# Patient Record
Sex: MALE | Race: WHITE | NOT HISPANIC OR LATINO
[De-identification: names, ages, dates, MRNs, and addresses within clinical notes are randomized per-mention and may not be internally consistent; named-entity substitution may affect disease eponyms.]

---

## 2018-01-23 ENCOUNTER — TRANSCRIPTION ENCOUNTER (OUTPATIENT)
Age: 63
End: 2018-01-23

## 2018-03-16 ENCOUNTER — APPOINTMENT (OUTPATIENT)
Dept: ORTHOPEDIC SURGERY | Facility: CLINIC | Age: 63
End: 2018-03-16
Payer: COMMERCIAL

## 2018-03-16 VITALS — WEIGHT: 225 LBS | BODY MASS INDEX: 28.88 KG/M2 | HEIGHT: 74 IN

## 2018-03-16 DIAGNOSIS — Z87.39 PERSONAL HISTORY OF OTHER DISEASES OF THE MUSCULOSKELETAL SYSTEM AND CONNECTIVE TISSUE: ICD-10-CM

## 2018-03-16 DIAGNOSIS — Z86.39 PERSONAL HISTORY OF OTHER ENDOCRINE, NUTRITIONAL AND METABOLIC DISEASE: ICD-10-CM

## 2018-03-16 DIAGNOSIS — Z78.9 OTHER SPECIFIED HEALTH STATUS: ICD-10-CM

## 2018-03-16 DIAGNOSIS — M54.5 LOW BACK PAIN: ICD-10-CM

## 2018-03-16 PROCEDURE — 73562 X-RAY EXAM OF KNEE 3: CPT | Mod: 50

## 2018-03-16 PROCEDURE — 99204 OFFICE O/P NEW MOD 45 MIN: CPT

## 2018-03-16 RX ORDER — ATENOLOL 25 MG/1
25 TABLET ORAL
Qty: 90 | Refills: 0 | Status: ACTIVE | COMMUNITY
Start: 2017-10-05

## 2018-03-16 RX ORDER — ALLOPURINOL 300 MG/1
300 TABLET ORAL
Qty: 90 | Refills: 0 | Status: ACTIVE | COMMUNITY
Start: 2017-10-18

## 2018-03-26 ENCOUNTER — APPOINTMENT (OUTPATIENT)
Dept: ORTHOPEDIC SURGERY | Facility: CLINIC | Age: 63
End: 2018-03-26
Payer: COMMERCIAL

## 2018-03-26 DIAGNOSIS — M47.817 SPONDYLOSIS W/OUT MYELOPATHY OR RADICULOPATHY, LUMBOSACRAL REGION: ICD-10-CM

## 2018-03-26 PROCEDURE — 72100 X-RAY EXAM L-S SPINE 2/3 VWS: CPT

## 2018-03-26 PROCEDURE — 99215 OFFICE O/P EST HI 40 MIN: CPT

## 2018-05-21 ENCOUNTER — NON-APPOINTMENT (OUTPATIENT)
Age: 63
End: 2018-05-21

## 2018-05-21 ENCOUNTER — APPOINTMENT (OUTPATIENT)
Dept: CARDIOLOGY | Facility: CLINIC | Age: 63
End: 2018-05-21
Payer: COMMERCIAL

## 2018-05-21 VITALS
HEIGHT: 74 IN | BODY MASS INDEX: 29.9 KG/M2 | SYSTOLIC BLOOD PRESSURE: 157 MMHG | WEIGHT: 233 LBS | DIASTOLIC BLOOD PRESSURE: 80 MMHG | OXYGEN SATURATION: 94 % | HEART RATE: 85 BPM

## 2018-05-21 DIAGNOSIS — Z82.49 FAMILY HISTORY OF ISCHEMIC HEART DISEASE AND OTHER DISEASES OF THE CIRCULATORY SYSTEM: ICD-10-CM

## 2018-05-21 DIAGNOSIS — R73.03 PREDIABETES.: ICD-10-CM

## 2018-05-21 DIAGNOSIS — R09.89 OTHER SPECIFIED SYMPTOMS AND SIGNS INVOLVING THE CIRCULATORY AND RESPIRATORY SYSTEMS: ICD-10-CM

## 2018-05-21 DIAGNOSIS — R07.2 PRECORDIAL PAIN: ICD-10-CM

## 2018-05-21 PROCEDURE — 93000 ELECTROCARDIOGRAM COMPLETE: CPT

## 2018-05-21 PROCEDURE — 99205 OFFICE O/P NEW HI 60 MIN: CPT

## 2018-05-23 ENCOUNTER — APPOINTMENT (OUTPATIENT)
Dept: CARDIOLOGY | Facility: CLINIC | Age: 63
End: 2018-05-23
Payer: COMMERCIAL

## 2018-05-23 PROCEDURE — 93015 CV STRESS TEST SUPVJ I&R: CPT

## 2018-06-02 ENCOUNTER — APPOINTMENT (OUTPATIENT)
Dept: CARDIOLOGY | Facility: CLINIC | Age: 63
End: 2018-06-02
Payer: COMMERCIAL

## 2018-06-02 PROCEDURE — 93880 EXTRACRANIAL BILAT STUDY: CPT

## 2018-06-20 ENCOUNTER — APPOINTMENT (OUTPATIENT)
Dept: ORTHOPEDIC SURGERY | Facility: CLINIC | Age: 63
End: 2018-06-20
Payer: COMMERCIAL

## 2018-06-20 PROCEDURE — 73562 X-RAY EXAM OF KNEE 3: CPT | Mod: 50

## 2018-06-20 PROCEDURE — 99214 OFFICE O/P EST MOD 30 MIN: CPT

## 2018-06-20 RX ORDER — LANCETS 30 GAUGE
EACH MISCELLANEOUS
Qty: 300 | Refills: 0 | Status: ACTIVE | COMMUNITY
Start: 2018-04-10

## 2018-06-21 ENCOUNTER — LABORATORY RESULT (OUTPATIENT)
Age: 63
End: 2018-06-21

## 2018-07-09 ENCOUNTER — APPOINTMENT (OUTPATIENT)
Dept: RADIOLOGY | Facility: HOSPITAL | Age: 63
End: 2018-07-09
Payer: COMMERCIAL

## 2018-07-09 ENCOUNTER — OUTPATIENT (OUTPATIENT)
Dept: OUTPATIENT SERVICES | Facility: HOSPITAL | Age: 63
LOS: 1 days | End: 2018-07-09

## 2018-07-09 DIAGNOSIS — Z98.89 OTHER SPECIFIED POSTPROCEDURAL STATES: Chronic | ICD-10-CM

## 2018-07-09 DIAGNOSIS — R11.2 NAUSEA WITH VOMITING, UNSPECIFIED: ICD-10-CM

## 2018-07-09 PROCEDURE — 74220 X-RAY XM ESOPHAGUS 1CNTRST: CPT | Mod: 26

## 2018-07-11 ENCOUNTER — APPOINTMENT (OUTPATIENT)
Dept: ORTHOPEDIC SURGERY | Facility: CLINIC | Age: 63
End: 2018-07-11
Payer: COMMERCIAL

## 2018-07-11 PROCEDURE — 99214 OFFICE O/P EST MOD 30 MIN: CPT

## 2018-07-26 PROBLEM — M47.817 LUMBOSACRAL SPONDYLOSIS: Status: ACTIVE | Noted: 2018-03-26

## 2018-10-12 ENCOUNTER — APPOINTMENT (OUTPATIENT)
Dept: ORTHOPEDIC SURGERY | Facility: CLINIC | Age: 63
End: 2018-10-12
Payer: COMMERCIAL

## 2018-10-12 DIAGNOSIS — M25.561 PAIN IN RIGHT KNEE: ICD-10-CM

## 2018-10-12 DIAGNOSIS — M25.562 PAIN IN RIGHT KNEE: ICD-10-CM

## 2018-10-12 DIAGNOSIS — T84.82XA FIBROSIS DUE TO INTERNAL ORTHOPEDIC PROSTHETIC DEVICES, IMPLANTS AND GRAFTS, INITIAL ENCOUNTER: ICD-10-CM

## 2018-10-12 PROCEDURE — 99214 OFFICE O/P EST MOD 30 MIN: CPT

## 2018-10-12 PROCEDURE — 73562 X-RAY EXAM OF KNEE 3: CPT | Mod: 50

## 2018-12-26 ENCOUNTER — CLINICAL ADVICE (OUTPATIENT)
Age: 63
End: 2018-12-26

## 2019-01-04 ENCOUNTER — OUTPATIENT (OUTPATIENT)
Dept: OUTPATIENT SERVICES | Facility: HOSPITAL | Age: 64
LOS: 1 days | Discharge: HOME | End: 2019-01-04
Payer: MEDICARE

## 2019-01-04 VITALS
OXYGEN SATURATION: 96 % | SYSTOLIC BLOOD PRESSURE: 121 MMHG | HEIGHT: 74 IN | TEMPERATURE: 98 F | HEART RATE: 73 BPM | WEIGHT: 234.35 LBS | DIASTOLIC BLOOD PRESSURE: 74 MMHG | RESPIRATION RATE: 18 BRPM

## 2019-01-04 VITALS
HEART RATE: 73 BPM | OXYGEN SATURATION: 96 % | TEMPERATURE: 98 F | HEIGHT: 74 IN | WEIGHT: 234.35 LBS | RESPIRATION RATE: 18 BRPM | DIASTOLIC BLOOD PRESSURE: 74 MMHG | SYSTOLIC BLOOD PRESSURE: 121 MMHG

## 2019-01-04 DIAGNOSIS — M25.662 STIFFNESS OF LEFT KNEE, NOT ELSEWHERE CLASSIFIED: ICD-10-CM

## 2019-01-04 DIAGNOSIS — N40.0 BENIGN PROSTATIC HYPERPLASIA WITHOUT LOWER URINARY TRACT SYMPTOMS: ICD-10-CM

## 2019-01-04 DIAGNOSIS — Z98.89 OTHER SPECIFIED POSTPROCEDURAL STATES: Chronic | ICD-10-CM

## 2019-01-04 DIAGNOSIS — I10 ESSENTIAL (PRIMARY) HYPERTENSION: ICD-10-CM

## 2019-01-04 DIAGNOSIS — E11.9 TYPE 2 DIABETES MELLITUS WITHOUT COMPLICATIONS: ICD-10-CM

## 2019-01-04 DIAGNOSIS — Z96.653 PRESENCE OF ARTIFICIAL KNEE JOINT, BILATERAL: Chronic | ICD-10-CM

## 2019-01-04 DIAGNOSIS — Z01.818 ENCOUNTER FOR OTHER PREPROCEDURAL EXAMINATION: ICD-10-CM

## 2019-01-04 LAB
APPEARANCE UR: CLEAR — SIGNIFICANT CHANGE UP
APTT BLD: 34.9 SEC — SIGNIFICANT CHANGE UP (ref 28.5–37)
BACTERIA # UR AUTO: ABNORMAL
BILIRUB UR-MCNC: NEGATIVE — SIGNIFICANT CHANGE UP
BLD GP AB SCN SERPL QL: SIGNIFICANT CHANGE UP
COLOR SPEC: YELLOW — SIGNIFICANT CHANGE UP
DIFF PNL FLD: ABNORMAL
EPI CELLS # UR: SIGNIFICANT CHANGE UP
GLUCOSE UR QL: NEGATIVE MG/DL — SIGNIFICANT CHANGE UP
INR BLD: 1.13 RATIO — SIGNIFICANT CHANGE UP (ref 0.88–1.16)
KETONES UR-MCNC: NEGATIVE — SIGNIFICANT CHANGE UP
LEUKOCYTE ESTERASE UR-ACNC: ABNORMAL
MRSA PCR RESULT.: SIGNIFICANT CHANGE UP
NITRITE UR-MCNC: NEGATIVE — SIGNIFICANT CHANGE UP
PH UR: 5 — SIGNIFICANT CHANGE UP (ref 5–8)
PROT UR-MCNC: NEGATIVE MG/DL — SIGNIFICANT CHANGE UP
PROTHROM AB SERPL-ACNC: 12.7 SEC — SIGNIFICANT CHANGE UP (ref 10–12.9)
RBC CASTS # UR COMP ASSIST: SIGNIFICANT CHANGE UP /HPF (ref 0–4)
S AUREUS DNA NOSE QL NAA+PROBE: SIGNIFICANT CHANGE UP
SP GR SPEC: 1.02 — SIGNIFICANT CHANGE UP (ref 1.01–1.02)
UROBILINOGEN FLD QL: NEGATIVE MG/DL — SIGNIFICANT CHANGE UP
WBC UR QL: >50

## 2019-01-04 PROCEDURE — 93010 ELECTROCARDIOGRAM REPORT: CPT | Mod: NC

## 2019-01-04 NOTE — H&P PST ADULT - HISTORY OF PRESENT ILLNESS
64yo male with medical h/o BPH, T2DM, HTN, OA, with PSHx of bilateral knee replacement in 2015. Pt reports pain to left knee s/p replacement. Pt presents today for presurgical testing for Revision of Left Total Knee Replacement scheduled for 1/15/2019

## 2019-01-04 NOTE — OCCUPATIONAL THERAPY INITIAL EVALUATION ADULT - PERTINENT HX OF CURRENT PROBLEM, REHAB EVAL
Pt is slated for elective surgery for revision of left TKR, at a later date with MD due to OA, pain and DJD. Pt has a history of multiple comorbidities.

## 2019-01-04 NOTE — OCCUPATIONAL THERAPY INITIAL EVALUATION ADULT - COORDINATION ASSESSED, REHAB EVAL
heel to shin/intact in BUE/RLE, diminished in left LE,/finger to nose intact in BUE/RLE, diminished in left LE,/finger to nose/heel to shin

## 2019-01-04 NOTE — OCCUPATIONAL THERAPY INITIAL EVALUATION ADULT - RANGE OF MOTION EXAMINATION, LOWER EXTREMITY
Pt has limitation in flexion and extension in left knee/Left LE Active ROM was WFL (within functional limits)/Left LE Passive ROM was WFL (w/i functional limits)

## 2019-01-04 NOTE — OCCUPATIONAL THERAPY INITIAL EVALUATION ADULT - ANTICIPATED DISCHARGE DISPOSITION, OT EVAL
Recommend home with raised toilet seat with hand bars , rolling walker and OT referral to enable patient to safely perform ADL management and functional mobility.

## 2019-01-04 NOTE — H&P PST ADULT - PMH
BPH (benign prostatic hypertrophy)    Diabetes    Essential hypertension    Hepatitis A    Osteoarthritis of both knees, unspecified osteoarthritis type    Tendonitis  posterior left ankle - s/p MRI - PT currently  Ureteral calculus  2013 - passed on own - takes allopurinol

## 2019-01-04 NOTE — PHYSICAL THERAPY INITIAL EVALUATION ADULT - PERTINENT HX OF CURRENT PROBLEM, REHAB EVAL
Patient attends Pre-Op Testing today following consult with Dr. Daniels due to chronic pain to revision TKR. Significant surgical/medical histories of. Elective LTKR Revision is now scheduled in this facility for 01/15/19.

## 2019-01-04 NOTE — H&P PST ADULT - NEGATIVE GENERAL GENITOURINARY SYMPTOMS
no dysuria/no urinary hesitancy/normal urinary frequency/no bladder infections/no hematuria/no flank pain L/no renal colic/no flank pain R/no urine discoloration/no incontinence/no nocturia

## 2019-01-04 NOTE — PHYSICAL THERAPY INITIAL EVALUATION ADULT - ADDITIONAL COMMENTS
Typical pain range from rest to activity is 7-10/10. Per patient, pain is worse at the end of the day. Pain is relieved by rest.  Reports lives with in private house with a threshold to enter. Bedroom and bathroom at same level. Bathroom is a step-in shower. Endorses will be supported by wife post-op. Patient is independent with mobility. Reports no longer has mobility/adaptive devices from previous surgery. 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 Patient denies a fall in past 6 months.

## 2019-01-04 NOTE — OCCUPATIONAL THERAPY INITIAL EVALUATION ADULT - SOCIAL CONCERNS
Complex psychosocial needs/coping issues/Pt has his spouse to assist him after discharge home post-operatively.

## 2019-01-04 NOTE — H&P PST ADULT - NSANTHOSAYNRD_GEN_A_CORE
No. BORIS screening performed.  STOP BANG Legend: 0-2 = LOW Risk; 3-4 = INTERMEDIATE Risk; 5-8 = HIGH Risk

## 2019-01-04 NOTE — OCCUPATIONAL THERAPY INITIAL EVALUATION ADULT - LIVES WITH, PROFILE
in a private house with a small step to enter. All living amenities are located on one level./spouse spouse/in a private house with a small step to enter. All living amenities are located on one level. The bathroom has a walk shower and comfort height  toilet.

## 2019-01-04 NOTE — H&P PST ADULT - PROBLEM SELECTOR PLAN 1
Revision of Left Total Knee Replacement scheduled for 1/15/2019  Pre-op instructions given. Pt verbalized understanding  Chlorhexidine wash instructions given  Pending: Medical clearance

## 2019-01-04 NOTE — OCCUPATIONAL THERAPY INITIAL EVALUATION ADULT - ADDITIONAL COMMENTS
Pt is functioning in his roles, self sufficient, driving & ambulating independently in the community without any assistive devices. Pt owns no DME . Pt is right hand dominant and wears glasses for reading. Pt c/o daily  pain with intensity 7/10 in left knee  by the end of each day. this impact ambulation . sleeping an prolonged standing ;pt takes no medication  PRN for pain relief. Pt scores 100% of patient specific scale . Pt exercises 3-4 times at the gym weekly and was receiving out patient PT intervention up until a month ago. Pt is functioning in his roles, self sufficient, driving & ambulating independently in the community without any assistive devices. Pt owns no DME . Pt is right hand dominant and wears glasses for reading. Pt c/o daily pain with intensity 7/10 in his left knee, by the end of each day. This impact ambulation , sleeping an prolonged standing ; pt takes no medication  PRN for pain relief. Pt scores 100% of patient specific scale . Pt exercises 3-4 times at the gym weekly and was receiving out patient PT intervention up until a month ago.

## 2019-01-04 NOTE — PHYSICAL THERAPY INITIAL EVALUATION ADULT - GENERAL OBSERVATIONS, REHAB EVAL
Patient encountered sitting in [facility] waiting area, agreeable to PT pre-op evaluation. Patient is for elective revision of left total knee arthroplasty at a later date from now.

## 2019-01-04 NOTE — PHYSICAL THERAPY INITIAL EVALUATION ADULT - CRITERIA FOR SKILLED THERAPEUTIC INTERVENTIONS
anticipated discharge recommendation/functional limitations in following categories/:CH,:CH,:CH/impairments found/risk reduction/prevention

## 2019-01-04 NOTE — H&P PST ADULT - NEGATIVE MUSCULOSKELETAL SYMPTOMS
no muscle weakness/no leg pain R/no arthralgia/no joint swelling/no myalgia/no stiffness/no arm pain L/no arm pain R/no neck pain/no muscle cramps/no back pain

## 2019-01-04 NOTE — PHYSICAL THERAPY INITIAL EVALUATION ADULT - MODIFIED CLINICAL TEST OF SENSORY INTEGRATION IN BALANCE TEST
5x Sit to Stand Test = unable due to limitation in motion and weakness, indicating significant impairment c functional mobility & strength  ; 2 Minute Walk Test = 422 feet without devices or rest stops, Pain rating 5/10, measured for baseline recording

## 2019-01-14 RX ORDER — ACETAMINOPHEN 500 MG
975 TABLET ORAL EVERY 8 HOURS
Qty: 0 | Refills: 0 | Status: DISCONTINUED | OUTPATIENT
Start: 2019-01-15 | End: 2019-01-17

## 2019-01-14 RX ORDER — POLYETHYLENE GLYCOL 3350 17 G/17G
17 POWDER, FOR SOLUTION ORAL DAILY
Qty: 0 | Refills: 0 | Status: DISCONTINUED | OUTPATIENT
Start: 2019-01-15 | End: 2019-01-17

## 2019-01-14 RX ORDER — SENNA PLUS 8.6 MG/1
2 TABLET ORAL AT BEDTIME
Qty: 0 | Refills: 0 | Status: DISCONTINUED | OUTPATIENT
Start: 2019-01-15 | End: 2019-01-17

## 2019-01-14 RX ORDER — OXYCODONE HYDROCHLORIDE 5 MG/1
5 TABLET ORAL EVERY 4 HOURS
Qty: 0 | Refills: 0 | Status: DISCONTINUED | OUTPATIENT
Start: 2019-01-15 | End: 2019-01-17

## 2019-01-14 RX ORDER — ONDANSETRON 8 MG/1
4 TABLET, FILM COATED ORAL EVERY 6 HOURS
Qty: 0 | Refills: 0 | Status: DISCONTINUED | OUTPATIENT
Start: 2019-01-15 | End: 2019-01-17

## 2019-01-14 RX ORDER — SODIUM CHLORIDE 9 MG/ML
1000 INJECTION, SOLUTION INTRAVENOUS
Qty: 0 | Refills: 0 | Status: DISCONTINUED | OUTPATIENT
Start: 2019-01-15 | End: 2019-01-17

## 2019-01-14 RX ORDER — DOCUSATE SODIUM 100 MG
100 CAPSULE ORAL THREE TIMES A DAY
Qty: 0 | Refills: 0 | Status: DISCONTINUED | OUTPATIENT
Start: 2019-01-15 | End: 2019-01-17

## 2019-01-14 RX ORDER — ATENOLOL 25 MG/1
25 TABLET ORAL DAILY
Qty: 0 | Refills: 0 | Status: DISCONTINUED | OUTPATIENT
Start: 2019-01-15 | End: 2019-01-17

## 2019-01-14 RX ORDER — TAMSULOSIN HYDROCHLORIDE 0.4 MG/1
0.4 CAPSULE ORAL DAILY
Qty: 0 | Refills: 0 | Status: DISCONTINUED | OUTPATIENT
Start: 2019-01-15 | End: 2019-01-17

## 2019-01-14 RX ORDER — ALLOPURINOL 300 MG
300 TABLET ORAL DAILY
Qty: 0 | Refills: 0 | Status: DISCONTINUED | OUTPATIENT
Start: 2019-01-15 | End: 2019-01-17

## 2019-01-14 RX ORDER — OXYCODONE HYDROCHLORIDE 5 MG/1
10 TABLET ORAL EVERY 4 HOURS
Qty: 0 | Refills: 0 | Status: DISCONTINUED | OUTPATIENT
Start: 2019-01-15 | End: 2019-01-17

## 2019-01-14 RX ORDER — INSULIN LISPRO 100/ML
VIAL (ML) SUBCUTANEOUS
Qty: 0 | Refills: 0 | Status: DISCONTINUED | OUTPATIENT
Start: 2019-01-15 | End: 2019-01-17

## 2019-01-14 RX ORDER — ENOXAPARIN SODIUM 100 MG/ML
40 INJECTION SUBCUTANEOUS EVERY 24 HOURS
Qty: 0 | Refills: 0 | Status: DISCONTINUED | OUTPATIENT
Start: 2019-01-16 | End: 2019-01-17

## 2019-01-14 RX ORDER — BENZOCAINE AND MENTHOL 5; 1 G/100ML; G/100ML
1 LIQUID ORAL EVERY 6 HOURS
Qty: 0 | Refills: 0 | Status: DISCONTINUED | OUTPATIENT
Start: 2019-01-15 | End: 2019-01-17

## 2019-01-14 RX ORDER — HYDROMORPHONE HYDROCHLORIDE 2 MG/ML
0.5 INJECTION INTRAMUSCULAR; INTRAVENOUS; SUBCUTANEOUS EVERY 4 HOURS
Qty: 0 | Refills: 0 | Status: DISCONTINUED | OUTPATIENT
Start: 2019-01-15 | End: 2019-01-17

## 2019-01-14 RX ORDER — MAGNESIUM HYDROXIDE 400 MG/1
30 TABLET, CHEWABLE ORAL DAILY
Qty: 0 | Refills: 0 | Status: DISCONTINUED | OUTPATIENT
Start: 2019-01-15 | End: 2019-01-17

## 2019-01-14 RX ORDER — ACETAMINOPHEN 500 MG
650 TABLET ORAL EVERY 6 HOURS
Qty: 0 | Refills: 0 | Status: DISCONTINUED | OUTPATIENT
Start: 2019-01-15 | End: 2019-01-17

## 2019-01-14 RX ORDER — METOCLOPRAMIDE HCL 10 MG
10 TABLET ORAL EVERY 6 HOURS
Qty: 0 | Refills: 0 | Status: DISCONTINUED | OUTPATIENT
Start: 2019-01-15 | End: 2019-01-17

## 2019-01-15 ENCOUNTER — INPATIENT (INPATIENT)
Facility: HOSPITAL | Age: 64
LOS: 1 days | Discharge: HOME HEALTH | End: 2019-01-17
Attending: ORTHOPAEDIC SURGERY | Admitting: ORTHOPAEDIC SURGERY
Payer: MEDICARE

## 2019-01-15 ENCOUNTER — TRANSCRIPTION ENCOUNTER (OUTPATIENT)
Age: 64
End: 2019-01-15

## 2019-01-15 ENCOUNTER — RESULT REVIEW (OUTPATIENT)
Age: 64
End: 2019-01-15

## 2019-01-15 ENCOUNTER — APPOINTMENT (OUTPATIENT)
Dept: ORTHOPEDIC SURGERY | Facility: HOSPITAL | Age: 64
End: 2019-01-15

## 2019-01-15 VITALS
RESPIRATION RATE: 15 BRPM | WEIGHT: 229.94 LBS | TEMPERATURE: 97 F | HEIGHT: 74 IN | DIASTOLIC BLOOD PRESSURE: 75 MMHG | OXYGEN SATURATION: 97 % | SYSTOLIC BLOOD PRESSURE: 139 MMHG | HEART RATE: 71 BPM

## 2019-01-15 DIAGNOSIS — Z96.653 PRESENCE OF ARTIFICIAL KNEE JOINT, BILATERAL: Chronic | ICD-10-CM

## 2019-01-15 DIAGNOSIS — Z98.89 OTHER SPECIFIED POSTPROCEDURAL STATES: Chronic | ICD-10-CM

## 2019-01-15 LAB
ANION GAP SERPL CALC-SCNC: 7 MMOL/L — SIGNIFICANT CHANGE UP (ref 5–17)
BUN SERPL-MCNC: 14 MG/DL — SIGNIFICANT CHANGE UP (ref 7–23)
CALCIUM SERPL-MCNC: 8.8 MG/DL — SIGNIFICANT CHANGE UP (ref 8.5–10.1)
CHLORIDE SERPL-SCNC: 107 MMOL/L — SIGNIFICANT CHANGE UP (ref 96–108)
CO2 SERPL-SCNC: 26 MMOL/L — SIGNIFICANT CHANGE UP (ref 22–31)
CREAT SERPL-MCNC: 0.84 MG/DL — SIGNIFICANT CHANGE UP (ref 0.5–1.3)
GLUCOSE BLDC GLUCOMTR-MCNC: 113 MG/DL — HIGH (ref 70–99)
GLUCOSE BLDC GLUCOMTR-MCNC: 155 MG/DL — HIGH (ref 70–99)
GLUCOSE BLDC GLUCOMTR-MCNC: 196 MG/DL — HIGH (ref 70–99)
GLUCOSE SERPL-MCNC: 162 MG/DL — HIGH (ref 70–99)
HCT VFR BLD CALC: 46.3 % — SIGNIFICANT CHANGE UP (ref 39–50)
HGB BLD-MCNC: 14.9 G/DL — SIGNIFICANT CHANGE UP (ref 13–17)
INR BLD: 1.23 RATIO — HIGH (ref 0.88–1.16)
MCHC RBC-ENTMCNC: 30.7 PG — SIGNIFICANT CHANGE UP (ref 27–34)
MCHC RBC-ENTMCNC: 32.2 GM/DL — SIGNIFICANT CHANGE UP (ref 32–36)
MCV RBC AUTO: 95.5 FL — SIGNIFICANT CHANGE UP (ref 80–100)
NRBC # BLD: 0 /100 WBCS — SIGNIFICANT CHANGE UP (ref 0–0)
PLATELET # BLD AUTO: 266 K/UL — SIGNIFICANT CHANGE UP (ref 150–400)
POTASSIUM SERPL-MCNC: 4.3 MMOL/L — SIGNIFICANT CHANGE UP (ref 3.5–5.3)
POTASSIUM SERPL-SCNC: 4.3 MMOL/L — SIGNIFICANT CHANGE UP (ref 3.5–5.3)
PROTHROM AB SERPL-ACNC: 13.9 SEC — HIGH (ref 10–12.9)
RBC # BLD: 4.85 M/UL — SIGNIFICANT CHANGE UP (ref 4.2–5.8)
RBC # FLD: 13.1 % — SIGNIFICANT CHANGE UP (ref 10.3–14.5)
SODIUM SERPL-SCNC: 140 MMOL/L — SIGNIFICANT CHANGE UP (ref 135–145)
WBC # BLD: 9.9 K/UL — SIGNIFICANT CHANGE UP (ref 3.8–10.5)
WBC # FLD AUTO: 9.9 K/UL — SIGNIFICANT CHANGE UP (ref 3.8–10.5)

## 2019-01-15 PROCEDURE — 73560 X-RAY EXAM OF KNEE 1 OR 2: CPT | Mod: 26,LT

## 2019-01-15 PROCEDURE — 27487 REVISE/REPLACE KNEE JOINT: CPT | Mod: LT

## 2019-01-15 PROCEDURE — 88311 DECALCIFY TISSUE: CPT | Mod: 26

## 2019-01-15 PROCEDURE — 88300 SURGICAL PATH GROSS: CPT | Mod: 26,59

## 2019-01-15 PROCEDURE — 88331 PATH CONSLTJ SURG 1 BLK 1SPC: CPT | Mod: 26

## 2019-01-15 PROCEDURE — 88305 TISSUE EXAM BY PATHOLOGIST: CPT | Mod: 26

## 2019-01-15 RX ORDER — SODIUM CHLORIDE 9 MG/ML
1000 INJECTION, SOLUTION INTRAVENOUS
Qty: 0 | Refills: 0 | Status: DISCONTINUED | OUTPATIENT
Start: 2019-01-15 | End: 2019-01-15

## 2019-01-15 RX ORDER — CELECOXIB 200 MG/1
200 CAPSULE ORAL ONCE
Qty: 0 | Refills: 0 | Status: COMPLETED | OUTPATIENT
Start: 2019-01-15 | End: 2019-01-15

## 2019-01-15 RX ORDER — FENTANYL CITRATE 50 UG/ML
25 INJECTION INTRAVENOUS
Qty: 0 | Refills: 0 | Status: DISCONTINUED | OUTPATIENT
Start: 2019-01-15 | End: 2019-01-15

## 2019-01-15 RX ORDER — SODIUM CHLORIDE 9 MG/ML
3 INJECTION INTRAMUSCULAR; INTRAVENOUS; SUBCUTANEOUS EVERY 8 HOURS
Qty: 0 | Refills: 0 | Status: DISCONTINUED | OUTPATIENT
Start: 2019-01-15 | End: 2019-01-15

## 2019-01-15 RX ORDER — CEFAZOLIN SODIUM 1 G
2000 VIAL (EA) INJECTION EVERY 8 HOURS
Qty: 0 | Refills: 0 | Status: COMPLETED | OUTPATIENT
Start: 2019-01-15 | End: 2019-01-16

## 2019-01-15 RX ORDER — ASPIRIN/CALCIUM CARB/MAGNESIUM 324 MG
1 TABLET ORAL
Qty: 60 | Refills: 0 | OUTPATIENT
Start: 2019-01-15 | End: 2019-02-13

## 2019-01-15 RX ORDER — HYDROMORPHONE HYDROCHLORIDE 2 MG/ML
0.5 INJECTION INTRAMUSCULAR; INTRAVENOUS; SUBCUTANEOUS
Qty: 0 | Refills: 0 | Status: DISCONTINUED | OUTPATIENT
Start: 2019-01-15 | End: 2019-01-15

## 2019-01-15 RX ORDER — ACETAMINOPHEN 500 MG
650 TABLET ORAL ONCE
Qty: 0 | Refills: 0 | Status: COMPLETED | OUTPATIENT
Start: 2019-01-15 | End: 2019-01-15

## 2019-01-15 RX ORDER — OXYCODONE HYDROCHLORIDE 5 MG/1
20 TABLET ORAL ONCE
Qty: 0 | Refills: 0 | Status: DISCONTINUED | OUTPATIENT
Start: 2019-01-15 | End: 2019-01-15

## 2019-01-15 RX ORDER — ENOXAPARIN SODIUM 100 MG/ML
40 INJECTION SUBCUTANEOUS
Qty: 14 | Refills: 0 | OUTPATIENT
Start: 2019-01-15 | End: 2019-01-28

## 2019-01-15 RX ADMIN — SODIUM CHLORIDE 3 MILLILITER(S): 9 INJECTION INTRAMUSCULAR; INTRAVENOUS; SUBCUTANEOUS at 11:56

## 2019-01-15 RX ADMIN — SODIUM CHLORIDE 75 MILLILITER(S): 9 INJECTION, SOLUTION INTRAVENOUS at 20:44

## 2019-01-15 RX ADMIN — Medication 100 MILLIGRAM(S): at 21:43

## 2019-01-15 RX ADMIN — Medication 975 MILLIGRAM(S): at 22:40

## 2019-01-15 RX ADMIN — OXYCODONE HYDROCHLORIDE 20 MILLIGRAM(S): 5 TABLET ORAL at 11:57

## 2019-01-15 RX ADMIN — SODIUM CHLORIDE 100 MILLILITER(S): 9 INJECTION, SOLUTION INTRAVENOUS at 18:54

## 2019-01-15 RX ADMIN — Medication 1 TABLET(S): at 21:43

## 2019-01-15 RX ADMIN — HYDROMORPHONE HYDROCHLORIDE 0.5 MILLIGRAM(S): 2 INJECTION INTRAMUSCULAR; INTRAVENOUS; SUBCUTANEOUS at 19:05

## 2019-01-15 RX ADMIN — Medication 975 MILLIGRAM(S): at 21:43

## 2019-01-15 RX ADMIN — HYDROMORPHONE HYDROCHLORIDE 0.5 MILLIGRAM(S): 2 INJECTION INTRAMUSCULAR; INTRAVENOUS; SUBCUTANEOUS at 18:50

## 2019-01-15 RX ADMIN — Medication 100 MILLIGRAM(S): at 23:05

## 2019-01-15 NOTE — DISCHARGE NOTE ADULT - HOME CARE AGENCY
RN from [facility], [phone], will visit your home the day after discharge.  Skilled Nursing, Occupational Therapy, and  Physical Therapy evaluation will be provided.

## 2019-01-15 NOTE — DISCHARGE NOTE ADULT - HOSPITAL COURSE
The patient is a 63 year old Male status post elective Revision total knee Arthroplasty to the Left knee after failing outpatient nonoperative conservative management.  Patient presented to [facility] after being medically cleared for an elective surgical procedure. The patient was taken to the operating room on date mentioned above. Prophylactic antibiotics were started before the procedure and continued for 24 hours.  There were no complications during the procedure and patient tolerated the procedure well.  The patient was transferred to recovery room in stable condition and subsequently to surgical floor.  Patient was placed Lovenox  for anticoagulation.  All home medications were continued.  The patient received physical therapy daily and daily labs were followed.  HMV was removed on POD1. The dressing was kept clean, dry, intact and changed appropriately.  *The rest of the hospital stay was unremarkable.* The patient is a 63 year old Male status post elective Revision total knee Arthroplasty to the Left knee after failing outpatient nonoperative conservative management.  Patient presented to [facility] after being medically cleared for an elective surgical procedure. The patient was taken to the operating room on date mentioned above. Prophylactic antibiotics were started before the procedure and continued for 24 hours.  There were no complications during the procedure and patient tolerated the procedure well.  The patient was transferred to recovery room in stable condition and subsequently to surgical floor.  Patient was placed Lovenox  for anticoagulation.  All home medications were continued.  The patient received physical therapy daily and daily labs were followed.  HMV was removed on POD1. The dressing was kept clean, dry, intact and changed appropriately. The rest of the hospital stay was unremarkable.

## 2019-01-15 NOTE — DISCHARGE NOTE ADULT - MEDICATION SUMMARY - MEDICATIONS TO TAKE
I will START or STAY ON the medications listed below when I get home from the hospital:    Percocet 5/325 oral tablet  -- 1 tab(s) by mouth every 4 to 6 hours, As Needed -for severe pain MDD:6   -- Caution federal law prohibits the transfer of this drug to any person other  than the person for whom it was prescribed.  May cause drowsiness.  Alcohol may intensify this effect.  Use care when operating dangerous machinery.  This prescription cannot be refilled.  This product contains acetaminophen.  Do not use  with any other product containing acetaminophen to prevent possible liver damage.  Using more of this medication than prescribed may cause serious breathing problems.    -- Indication: For prn for pain    Ecotrin 325 mg oral delayed release tablet  -- 1 tab(s) by mouth 2 times a day for blood clot prevention. Start on 15th day after surgery. Continue for 30 days.  -- Swallow whole.  Do not crush.  Take with food or milk.    -- Indication: For Blood clot prevention. Take twice a day for 30 days starting on the 15th day after surgery. Do not skip doses.    tamsulosin 0.4 mg oral capsule  -- 1 cap(s) by mouth once a day  -- Indication: For home med    Lovenox 40 mg/0.4 mL injectable solution  -- 40 milligram(s) subcutaneously once a day for blood clot prevention. Take for first14 days following surgery.  -- It is very important that you take or use this exactly as directed.  Do not skip doses or discontinue unless directed by your doctor.    -- Indication: For Blood clot prevention. Take once a day starting the day after surgery for 14 days.    metFORMIN 500 mg oral tablet  -- 1 tab(s) by mouth 2 times a day  -- Indication: For home med    allopurinol 300 mg oral tablet  -- 1 tab(s) by mouth once a day  -- Indication: For home med    atenolol 25 mg oral tablet  -- 1 tab(s) by mouth once a day  -- Indication: For home med    ferrous sulfate  -- 65 milligram(s) by mouth 3 times a day  -- Indication: For home med I will START or STAY ON the medications listed below when I get home from the hospital:    Ecotrin 325 mg oral delayed release tablet  -- 1 tab(s) by mouth 2 times a day for blood clot prevention. Start on 15th day after surgery. Continue for 30 days.  -- Swallow whole.  Do not crush.  Take with food or milk.    -- Indication: For DVT prophylaxis    Percocet 5/325 oral tablet  -- 1 tab(s) by mouth every 4 to 6 hours, As Needed -for severe pain MDD:6   -- Caution federal law prohibits the transfer of this drug to any person other  than the person for whom it was prescribed.  May cause drowsiness.  Alcohol may intensify this effect.  Use care when operating dangerous machinery.  This prescription cannot be refilled.  This product contains acetaminophen.  Do not use  with any other product containing acetaminophen to prevent possible liver damage.  Using more of this medication than prescribed may cause serious breathing problems.    -- Indication: For Severe Pain    tamsulosin 0.4 mg oral capsule  -- 1 cap(s) by mouth once a day  -- Indication: For home med    Lovenox 40 mg/0.4 mL injectable solution  -- 40 milligram(s) subcutaneously once a day for blood clot prevention. Take for first14 days following surgery.  -- It is very important that you take or use this exactly as directed.  Do not skip doses or discontinue unless directed by your doctor.    -- Indication: For DVT prophylaxis    metFORMIN 500 mg oral tablet  -- 1 tab(s) by mouth 2 times a day  -- Indication: For home med    allopurinol 300 mg oral tablet  -- 1 tab(s) by mouth once a day  -- Indication: For home med    atenolol 25 mg oral tablet  -- 1 tab(s) by mouth once a day  -- Indication: For home med    ferrous sulfate  -- 65 milligram(s) by mouth 3 times a day  -- Indication: For home med

## 2019-01-15 NOTE — BRIEF OPERATIVE NOTE - PROCEDURE
<<-----Click on this checkbox to enter Procedure Revision of knee replacement  01/15/2019    Active  DOREEN

## 2019-01-15 NOTE — DISCHARGE NOTE ADULT - PLAN OF CARE
1.	Pain Control  2.	Walking with full weight bearing as tolerated, with assistive devices (walker/cane as needed).  3.	DVT Prophylaxis with Lovenox 40mg daily for 2 weeks followed by Aspirin 325mg twice daily for 4 weeks  4.	PT as needed  5.	Follow up with Dr. Daniels as Outpatient in 10-14 Days after Discharge from the Hospital or Rehab. Call Office For Appointment. [phone]  6.	Remove Staples Post-Op Day 21, and Remove Dressing Post-Op Day 10, with Daily Dressing Changes as Need.  7.	Ice/Elevate affected area as Needed  8.	Keep Dressing  Clean and dry. Return to ADLs

## 2019-01-15 NOTE — DISCHARGE NOTE ADULT - CARE PROVIDER_API CALL
Roby Daniels), Orthopaedic Surgery  [facility] [place] [address]  Phone: [phone]  Fax: [phone]

## 2019-01-15 NOTE — DISCHARGE NOTE ADULT - PATIENT PORTAL LINK FT
You can access the [facility] Portal, offered by [facility], by registering with the following website: [email or web address]

## 2019-01-15 NOTE — PROGRESS NOTE ADULT - SUBJECTIVE AND OBJECTIVE BOX
Patient seen and examined. Pain controlled.       MEDICATIONS  (STANDING):  ceFAZolin   IVPB 2000 milliGRAM(s) IV Intermittent every 8 hours  lactated ringers. 1000 milliLiter(s) IV Continuous <Continuous>    Allergies    No Known Drug Allergies  shellfish (Pruritus; Hives)    Intolerances                            14.9   9.90  )-----------( 266      ( 15 Rodrigo 2019 18:56 )             46.3     15 Rodrigo 2019 18:56    140    |  107    |  14     ----------------------------<  162    4.3     |  26     |  0.84     Ca    8.8        15 Rodrigo 2019 18:56      PT/INR - ( 15 Rodrigo 2019 18:56 )   PT: 13.9 sec;   INR: 1.23 ratio           Vital Signs Last 24 Hrs  T(C): 36.6 (01-15-19 @ 19:55), Max: 36.6 (01-15-19 @ 19:20)  T(F): 97.8 (01-15-19 @ 19:55), Max: 97.8 (01-15-19 @ 19:20)  HR: 90 (01-15-19 @ 19:55) (71 - 97)  BP: 108/68 (01-15-19 @ 19:55) (102/74 - 139/75)  BP(mean): --  RR: 15 (01-15-19 @ 19:55) (13 - 15)  SpO2: 95% (01-15-19 @ 19:55) (95% - 98%)    Physical Exam  Gen: NAD  LLE:  Dressing c/d/i  +ehl/fhl/ta/gs function  L2-S1 silt  Dp/pt pulse intact  No calf ttp  Compartments soft    A/P: 63y Male sp L TKA POD 0  Pain control  DVT ppx  PT/WBAT/OOB  FU labs  Ice/elevate  Medical management appreciated  Incentive spirometry  Dispo planning

## 2019-01-15 NOTE — DISCHARGE NOTE ADULT - CARE PLAN
Principal Discharge DX:	History of revision of total knee arthroplasty  Goal:	Return to ADLs  Assessment and plan of treatment:	1.	Pain Control  2.	Walking with full weight bearing as tolerated, with assistive devices (walker/cane as needed).  3.	DVT Prophylaxis with Lovenox 40mg daily for 2 weeks followed by Aspirin 325mg twice daily for 4 weeks  4.	PT as needed  5.	Follow up with Dr. Daniels as Outpatient in 10-14 Days after Discharge from the Hospital or Rehab. Call Office For Appointment. [phone]  6.	Remove Staples Post-Op Day 21, and Remove Dressing Post-Op Day 10, with Daily Dressing Changes as Need.  7.	Ice/Elevate affected area as Needed  8.	Keep Dressing  Clean and dry.

## 2019-01-16 LAB
ANION GAP SERPL CALC-SCNC: 5 MMOL/L — SIGNIFICANT CHANGE UP (ref 5–17)
BUN SERPL-MCNC: 14 MG/DL — SIGNIFICANT CHANGE UP (ref 7–23)
CALCIUM SERPL-MCNC: 8.9 MG/DL — SIGNIFICANT CHANGE UP (ref 8.5–10.1)
CHLORIDE SERPL-SCNC: 107 MMOL/L — SIGNIFICANT CHANGE UP (ref 96–108)
CO2 SERPL-SCNC: 27 MMOL/L — SIGNIFICANT CHANGE UP (ref 22–31)
CREAT SERPL-MCNC: 0.76 MG/DL — SIGNIFICANT CHANGE UP (ref 0.5–1.3)
GLUCOSE BLDC GLUCOMTR-MCNC: 136 MG/DL — HIGH (ref 70–99)
GLUCOSE BLDC GLUCOMTR-MCNC: 137 MG/DL — HIGH (ref 70–99)
GLUCOSE BLDC GLUCOMTR-MCNC: 140 MG/DL — HIGH (ref 70–99)
GLUCOSE BLDC GLUCOMTR-MCNC: 154 MG/DL — HIGH (ref 70–99)
GLUCOSE SERPL-MCNC: 156 MG/DL — HIGH (ref 70–99)
HBA1C BLD-MCNC: 6.7 % — HIGH (ref 4–5.6)
HCT VFR BLD CALC: 39.9 % — SIGNIFICANT CHANGE UP (ref 39–50)
HGB BLD-MCNC: 13.5 G/DL — SIGNIFICANT CHANGE UP (ref 13–17)
MCHC RBC-ENTMCNC: 31.3 PG — SIGNIFICANT CHANGE UP (ref 27–34)
MCHC RBC-ENTMCNC: 33.8 GM/DL — SIGNIFICANT CHANGE UP (ref 32–36)
MCV RBC AUTO: 92.6 FL — SIGNIFICANT CHANGE UP (ref 80–100)
NRBC # BLD: 0 /100 WBCS — SIGNIFICANT CHANGE UP (ref 0–0)
PLATELET # BLD AUTO: 255 K/UL — SIGNIFICANT CHANGE UP (ref 150–400)
POTASSIUM SERPL-MCNC: 4.4 MMOL/L — SIGNIFICANT CHANGE UP (ref 3.5–5.3)
POTASSIUM SERPL-SCNC: 4.4 MMOL/L — SIGNIFICANT CHANGE UP (ref 3.5–5.3)
RBC # BLD: 4.31 M/UL — SIGNIFICANT CHANGE UP (ref 4.2–5.8)
RBC # FLD: 13.1 % — SIGNIFICANT CHANGE UP (ref 10.3–14.5)
SODIUM SERPL-SCNC: 139 MMOL/L — SIGNIFICANT CHANGE UP (ref 135–145)
WBC # BLD: 14.33 K/UL — HIGH (ref 3.8–10.5)
WBC # FLD AUTO: 14.33 K/UL — HIGH (ref 3.8–10.5)

## 2019-01-16 RX ORDER — ENOXAPARIN SODIUM 100 MG/ML
40 INJECTION SUBCUTANEOUS
Qty: 14 | Refills: 0 | OUTPATIENT
Start: 2019-01-16 | End: 2019-01-29

## 2019-01-16 RX ORDER — ASPIRIN/CALCIUM CARB/MAGNESIUM 324 MG
1 TABLET ORAL
Qty: 60 | Refills: 0 | OUTPATIENT
Start: 2019-01-16 | End: 2019-02-14

## 2019-01-16 RX ORDER — DIPHENHYDRAMINE HCL 50 MG
25 CAPSULE ORAL EVERY 6 HOURS
Qty: 0 | Refills: 0 | Status: DISCONTINUED | OUTPATIENT
Start: 2019-01-16 | End: 2019-01-17

## 2019-01-16 RX ADMIN — Medication 975 MILLIGRAM(S): at 22:42

## 2019-01-16 RX ADMIN — Medication 975 MILLIGRAM(S): at 06:20

## 2019-01-16 RX ADMIN — OXYCODONE HYDROCHLORIDE 10 MILLIGRAM(S): 5 TABLET ORAL at 15:43

## 2019-01-16 RX ADMIN — OXYCODONE HYDROCHLORIDE 10 MILLIGRAM(S): 5 TABLET ORAL at 08:52

## 2019-01-16 RX ADMIN — SODIUM CHLORIDE 75 MILLILITER(S): 9 INJECTION, SOLUTION INTRAVENOUS at 05:26

## 2019-01-16 RX ADMIN — Medication 975 MILLIGRAM(S): at 21:42

## 2019-01-16 RX ADMIN — Medication 1 TABLET(S): at 05:27

## 2019-01-16 RX ADMIN — OXYCODONE HYDROCHLORIDE 10 MILLIGRAM(S): 5 TABLET ORAL at 09:52

## 2019-01-16 RX ADMIN — ENOXAPARIN SODIUM 40 MILLIGRAM(S): 100 INJECTION SUBCUTANEOUS at 08:52

## 2019-01-16 RX ADMIN — SODIUM CHLORIDE 75 MILLILITER(S): 9 INJECTION, SOLUTION INTRAVENOUS at 21:42

## 2019-01-16 RX ADMIN — POLYETHYLENE GLYCOL 3350 17 GRAM(S): 17 POWDER, FOR SOLUTION ORAL at 11:25

## 2019-01-16 RX ADMIN — Medication 975 MILLIGRAM(S): at 14:42

## 2019-01-16 RX ADMIN — Medication 100 MILLIGRAM(S): at 05:27

## 2019-01-16 RX ADMIN — Medication 2: at 16:24

## 2019-01-16 RX ADMIN — Medication 1 TABLET(S): at 11:25

## 2019-01-16 RX ADMIN — Medication 975 MILLIGRAM(S): at 15:42

## 2019-01-16 RX ADMIN — HYDROMORPHONE HYDROCHLORIDE 0.5 MILLIGRAM(S): 2 INJECTION INTRAMUSCULAR; INTRAVENOUS; SUBCUTANEOUS at 16:28

## 2019-01-16 RX ADMIN — Medication 100 MILLIGRAM(S): at 07:03

## 2019-01-16 RX ADMIN — Medication 100 MILLIGRAM(S): at 14:42

## 2019-01-16 RX ADMIN — Medication 1 TABLET(S): at 14:42

## 2019-01-16 RX ADMIN — Medication 25 MILLIGRAM(S): at 16:57

## 2019-01-16 RX ADMIN — Medication 300 MILLIGRAM(S): at 14:42

## 2019-01-16 RX ADMIN — Medication 975 MILLIGRAM(S): at 05:27

## 2019-01-16 RX ADMIN — OXYCODONE HYDROCHLORIDE 10 MILLIGRAM(S): 5 TABLET ORAL at 14:43

## 2019-01-16 RX ADMIN — HYDROMORPHONE HYDROCHLORIDE 0.5 MILLIGRAM(S): 2 INJECTION INTRAMUSCULAR; INTRAVENOUS; SUBCUTANEOUS at 14:48

## 2019-01-16 RX ADMIN — ATENOLOL 25 MILLIGRAM(S): 25 TABLET ORAL at 05:27

## 2019-01-16 RX ADMIN — TAMSULOSIN HYDROCHLORIDE 0.4 MILLIGRAM(S): 0.4 CAPSULE ORAL at 11:25

## 2019-01-16 NOTE — PHYSICAL THERAPY INITIAL EVALUATION ADULT - ADDITIONAL COMMENTS
Wife will be available to assist pt in post -op care upon discharge home. Pt has a Rolling Walker and Pt has a straight cane, rolling walker, and 3-1 commode at home. commode.

## 2019-01-16 NOTE — OCCUPATIONAL THERAPY INITIAL EVALUATION ADULT - PLANNED THERAPY INTERVENTIONS, OT EVAL
transfer training/energy conservation techniques/IADL retraining/joint mobilization/neuromuscular re-education/ROM/strengthening/stretching/ADL retraining/balance training/bed mobility training

## 2019-01-16 NOTE — OCCUPATIONAL THERAPY INITIAL EVALUATION ADULT - GENERAL OBSERVATIONS, REHAB EVAL
Pt was seen for initial OT consult, encountered in bed with pneumatic teds and Hemovac drainage in place. Pt was AA&Ox4, cooperative & followed commands. Pt despite pain despite revision of left knee pain due to s/p TKR; this limits pt's activity tolerance ,balance, ADL management and functional mobility.

## 2019-01-16 NOTE — OCCUPATIONAL THERAPY INITIAL EVALUATION ADULT - ADDITIONAL COMMENTS
Prior to admission pt was functioning in his roles, self sufficient, driving & ambulating independently in the community without any assistive devices. Presently, pt needs assistance to complete lower body self care tasks due to pain, stiffness, decreased ROM and weakness in left Knee. Pt is right hand dominant and wears glasses for reading. . The scale below depicts a picture of the pt's current level of functioning. Barthel Index: Feeding Score____10__, Bathing Score___0___, Grooming Score___5__, Dressing Score__5___, Bowel Score__10___, Bladder Score___10___, Toilet Score___10__, Transfer Score___10___, Mobility Score__10___, Stairs Score__0___, Total Score___70/100__.

## 2019-01-16 NOTE — PROGRESS NOTE ADULT - SUBJECTIVE AND OBJECTIVE BOX
Pt S/E at bedside, no acute events overnight, pain controlled    Vital Signs Last 24 Hrs  T(C): 35.9 (16 Jan 2019 05:23), Max: 36.6 (15 Rodrigo 2019 19:20)  T(F): 96.6 (16 Jan 2019 05:23), Max: 97.8 (15 Rodrigo 2019 19:20)  HR: 76 (16 Jan 2019 05:23) (71 - 97)  BP: 123/68 (16 Jan 2019 05:23) (102/74 - 139/75)  BP(mean): --  RR: 16 (16 Jan 2019 05:23) (13 - 16)  SpO2: 96% (16 Jan 2019 05:23) (94% - 98%)    Gen: NAD,    Left Lower Extremity:  Dressing clean dry intact  +HMV  +EHL/FHL/TA/GS  SILT L3-S1  +DP/PT Pulses  Compartments soft  No calf TTP B/L

## 2019-01-16 NOTE — PHYSICAL THERAPY INITIAL EVALUATION ADULT - LIVES WITH, PROFILE
spouse/in a private house with 1 entry step and no hand rail. All living amenities are located on one level. The bathroom has a  walk in shower, retractable shower head  and standard toilet.

## 2019-01-16 NOTE — PHYSICAL THERAPY INITIAL EVALUATION ADULT - IMPAIRMENTS FOUND, PT EVAL
aerobic capacity/endurance/ROM/ergonomics and body mechanics/muscle strength/posture/gait, locomotion, and balance

## 2019-01-16 NOTE — PHYSICAL THERAPY INITIAL EVALUATION ADULT - GAIT DEVIATIONS NOTED, PT EVAL
decreased shlomo/decreased velocity of limb motion/decreased step length/decreased stride length/increased time in double stance

## 2019-01-16 NOTE — OCCUPATIONAL THERAPY INITIAL EVALUATION ADULT - PERTINENT HX OF CURRENT PROBLEM, REHAB EVAL
Pt was admitted for elective surgery for  s/p revision TKR, due to OA, pain and DJD. Pt has a history of multiple comorbidities.

## 2019-01-16 NOTE — PHYSICAL THERAPY INITIAL EVALUATION ADULT - PLANNED THERAPY INTERVENTIONS, PT EVAL
ROM/stretching/balance training/bed mobility training/gait training/postural re-education/strengthening/transfer training

## 2019-01-16 NOTE — OCCUPATIONAL THERAPY INITIAL EVALUATION ADULT - LIVES WITH, PROFILE
in a private house with 1 entry step and no hand rail. All living amenities are located on one level. The bathroom has a  walk in shower, retractable shower head  and standard toilet./spouse

## 2019-01-16 NOTE — OCCUPATIONAL THERAPY INITIAL EVALUATION ADULT - SOCIAL CONCERNS
Pt has his spouse to assist him after discharge home post-operatively./Complex psychosocial needs/coping issues

## 2019-01-16 NOTE — OCCUPATIONAL THERAPY INITIAL EVALUATION ADULT - RANGE OF MOTION EXAMINATION, LOWER EXTREMITY
Right LE Active ROM was WNL(within normal limits)/Right LE Passive ROM was WNL (within normal limits)/AA ROM IN left knee is 0-45 degrees with pain.

## 2019-01-16 NOTE — PHYSICAL THERAPY INITIAL EVALUATION ADULT - CRITERIA FOR SKILLED THERAPEUTIC INTERVENTIONS
risk reduction/prevention/impairments found/functional limitations in following categories/therapy frequency/predicted duration of therapy intervention/rehab potential/anticipated discharge recommendation

## 2019-01-16 NOTE — OCCUPATIONAL THERAPY INITIAL EVALUATION ADULT - ANTICIPATED DISCHARGE DISPOSITION, OT EVAL
Recommend home with OT referral to enable patient to safely perform ADL management and functional mobility. Pt has received a 3-in-1 commode, rolling walker.

## 2019-01-16 NOTE — CONSULT NOTE ADULT - SUBJECTIVE AND OBJECTIVE BOX
Chief Complaint:  Patient is a 63y old  Male who presents with a chief complaint of s/p L TKA (15 Rodrigo 2019 19:56)      HPI: Currently no sob or chest pain or palpitation . No cough or fever . No nausea . Tolerating orally . Voiding . Pain controlled . On Lovenox . Participated in PT .      Review of Systems:    General:  No wt loss, fevers, chills, night sweats  Eyes:  Good vision, no reported pain  ENT:  No sore throat, pain, runny nose, dysphagia  CV:  No pain, palpitations, hypo/hypertension  Resp:  No dyspnea, cough, tachypnea, wheezing  GI:  No pain, nausea, vomiting, diarrhea .  :  No pain, bleeding, incontinence, nocturia  Muscle:  No pain, weakness  Breast:  No pain, abscess, mass, discharge  Neuro:  No weakness, tingling, memory problems  Psych:  No fatigue, insomnia, mood problems, depression  Endocrine:  No polyuria, polydypsia, cold/heat intolerance  Heme:  No petechiae, ecchymosis, easy bruisability  Skin:  No rash, tattoos, scars, edema    Relevant Family History: NC . Allergy : NKDA .       Relevant Social History: no smoking .      PMH : HTn . DM . Gout . Meds : Allopurinol . Metformin . Atenelol .    Physical Exam:      Vital Signs:  Vital Signs Last 24 Hrs  T(C): 36.3 (16 Jan 2019 18:13), Max: 36.6 (15 Rodrigo 2019 19:20)  T(F): 97.4 (16 Jan 2019 18:13), Max: 97.8 (15 Rodrigo 2019 19:20)  HR: 78 (16 Jan 2019 18:13) (76 - 97)  BP: 115/69 (16 Jan 2019 18:13) (102/74 - 133/80)  BP(mean): --  RR: 16 (16 Jan 2019 18:13) (13 - 16)  SpO2: 96% (16 Jan 2019 18:13) (93% - 98%)  Daily Height in cm: 187.96 (15 Rodrigo 2019 20:15)    Daily   I&O's Summary    15 Rodrigo 2019 07:01  -  16 Jan 2019 07:00  --------------------------------------------------------  IN: 1095 mL / OUT: 1340 mL / NET: -245 mL    16 Jan 2019 07:01  -  16 Jan 2019 18:18  --------------------------------------------------------  IN: 480 mL / OUT: 142.5 mL / NET: 337.5 mL        General:  Appears stated age, well-groomed, well-nourished, no distress  HEENT:  NC/AT, patent nares w/ pink mucosa, OP clear w/o lesions, PERRL, EOMI, conjunctivae clear, no thyromegaly, nodules, adenopathy, no JVD  Chest:  Full & symmetric excursion, no increased effort, breath sounds clear  Cardiovascular:  Regular rhythm, S1, S2, no murmur/rub/S3/S4, no carotid/femoral/abdominal bruit, radial/pedal pulses 2+, no edema  Abdomen:  Soft, non-tender, non-distended, normoactive bowel sounds, no HSM  Extremities:  + ACE wrap with trace edema left leg . + Pulse .  Skin:  No rash/erythema/ecchymoses/petechiae/wounds/abscess/warm/dry  Musculoskeletal:  Intact .  Neuro/Psych:  Alert, oriented, normal and symmetric strength in UEs, LEs .    Laboratory:                            13.5   14.33 )-----------( 255      ( 16 Jan 2019 05:59 )             39.9     01-16    139  |  107  |  14  ----------------------------<  156<H>  4.4   |  27  |  0.76    Ca    8.9      16 Jan 2019 05:59            CAPILLARY BLOOD GLUCOSE      POCT Blood Glucose.: 154 mg/dL (16 Jan 2019 16:02)  POCT Blood Glucose.: 137 mg/dL (16 Jan 2019 11:07)  POCT Blood Glucose.: 136 mg/dL (16 Jan 2019 07:20)  POCT Blood Glucose.: 196 mg/dL (15 Rodrigo 2019 21:48)  POCT Blood Glucose.: 155 mg/dL (15 Rodrigo 2019 18:56)      PT/INR - ( 15 Rodrigo 2019 18:56 )   PT: 13.9 sec;   INR: 1.23 ratio               Imaging:      Assessment: S/P revision left knee . HTN . DM .      Plan: PT/OT . Pain control . Incentive spirometry . On Lovenox . Metformin on discharge . Meds reviewed and D/W the patient .

## 2019-01-17 VITALS
RESPIRATION RATE: 18 BRPM | HEART RATE: 84 BPM | DIASTOLIC BLOOD PRESSURE: 68 MMHG | OXYGEN SATURATION: 96 % | SYSTOLIC BLOOD PRESSURE: 132 MMHG

## 2019-01-17 LAB
ANION GAP SERPL CALC-SCNC: 6 MMOL/L — SIGNIFICANT CHANGE UP (ref 5–17)
BUN SERPL-MCNC: 12 MG/DL — SIGNIFICANT CHANGE UP (ref 7–23)
CALCIUM SERPL-MCNC: 8.4 MG/DL — LOW (ref 8.5–10.1)
CHLORIDE SERPL-SCNC: 106 MMOL/L — SIGNIFICANT CHANGE UP (ref 96–108)
CO2 SERPL-SCNC: 31 MMOL/L — SIGNIFICANT CHANGE UP (ref 22–31)
CREAT SERPL-MCNC: 0.84 MG/DL — SIGNIFICANT CHANGE UP (ref 0.5–1.3)
GLUCOSE BLDC GLUCOMTR-MCNC: 101 MG/DL — HIGH (ref 70–99)
GLUCOSE SERPL-MCNC: 106 MG/DL — HIGH (ref 70–99)
HCT VFR BLD CALC: 38.6 % — LOW (ref 39–50)
HGB BLD-MCNC: 12.5 G/DL — LOW (ref 13–17)
MCHC RBC-ENTMCNC: 30.6 PG — SIGNIFICANT CHANGE UP (ref 27–34)
MCHC RBC-ENTMCNC: 32.4 GM/DL — SIGNIFICANT CHANGE UP (ref 32–36)
MCV RBC AUTO: 94.4 FL — SIGNIFICANT CHANGE UP (ref 80–100)
NRBC # BLD: 0 /100 WBCS — SIGNIFICANT CHANGE UP (ref 0–0)
PLATELET # BLD AUTO: 236 K/UL — SIGNIFICANT CHANGE UP (ref 150–400)
POTASSIUM SERPL-MCNC: 4.3 MMOL/L — SIGNIFICANT CHANGE UP (ref 3.5–5.3)
POTASSIUM SERPL-SCNC: 4.3 MMOL/L — SIGNIFICANT CHANGE UP (ref 3.5–5.3)
RBC # BLD: 4.09 M/UL — LOW (ref 4.2–5.8)
RBC # FLD: 13.2 % — SIGNIFICANT CHANGE UP (ref 10.3–14.5)
SODIUM SERPL-SCNC: 143 MMOL/L — SIGNIFICANT CHANGE UP (ref 135–145)
WBC # BLD: 10.23 K/UL — SIGNIFICANT CHANGE UP (ref 3.8–10.5)
WBC # FLD AUTO: 10.23 K/UL — SIGNIFICANT CHANGE UP (ref 3.8–10.5)

## 2019-01-17 RX ADMIN — OXYCODONE HYDROCHLORIDE 10 MILLIGRAM(S): 5 TABLET ORAL at 01:48

## 2019-01-17 RX ADMIN — OXYCODONE HYDROCHLORIDE 10 MILLIGRAM(S): 5 TABLET ORAL at 09:36

## 2019-01-17 RX ADMIN — OXYCODONE HYDROCHLORIDE 10 MILLIGRAM(S): 5 TABLET ORAL at 08:36

## 2019-01-17 RX ADMIN — Medication 975 MILLIGRAM(S): at 06:48

## 2019-01-17 RX ADMIN — Medication 975 MILLIGRAM(S): at 05:48

## 2019-01-17 RX ADMIN — Medication 1 TABLET(S): at 05:49

## 2019-01-17 RX ADMIN — SODIUM CHLORIDE 75 MILLILITER(S): 9 INJECTION, SOLUTION INTRAVENOUS at 05:49

## 2019-01-17 RX ADMIN — ENOXAPARIN SODIUM 40 MILLIGRAM(S): 100 INJECTION SUBCUTANEOUS at 08:12

## 2019-01-17 RX ADMIN — ATENOLOL 25 MILLIGRAM(S): 25 TABLET ORAL at 05:49

## 2019-01-17 RX ADMIN — Medication 100 MILLIGRAM(S): at 05:49

## 2019-01-17 RX ADMIN — OXYCODONE HYDROCHLORIDE 10 MILLIGRAM(S): 5 TABLET ORAL at 00:48

## 2019-01-17 NOTE — PROGRESS NOTE ADULT - ASSESSMENT
A/P: 63M s/p L TKA POD 2  Pain Control  DVT ppx  WBAT  PT/OT  HMV Dc'd yesterday afternoon  Incentive Spirometry  Medical management appreciated  DC planning, home today with home care

## 2019-01-17 NOTE — PROGRESS NOTE ADULT - SUBJECTIVE AND OBJECTIVE BOX
Pt S/E at bedside, no acute events overnight, pain controlled, denies SOB or CP.     Vital Signs Last 24 Hrs  T(C): 36.7 (17 Jan 2019 05:43), Max: 36.8 (16 Jan 2019 23:57)  T(F): 98.1 (17 Jan 2019 05:43), Max: 98.2 (16 Jan 2019 23:57)  HR: 81 (17 Jan 2019 05:43) (77 - 89)  BP: 118/77 (17 Jan 2019 05:43) (104/61 - 118/77)  BP(mean): --  RR: 17 (17 Jan 2019 05:43) (16 - 17)  SpO2: 97% (17 Jan 2019 05:43) (93% - 97%)    Gen: NAD,    Left Lower Extremity:  Dressing clean dry intact  +EHL/FHL/TA/GS  SILT L3-S1  +DP/PT Pulses  Compartments soft  No calf TTP B/L

## 2019-01-18 ENCOUNTER — INBOUND DOCUMENT (OUTPATIENT)
Age: 64
End: 2019-01-18

## 2019-01-18 LAB — SURGICAL PATHOLOGY STUDY: SIGNIFICANT CHANGE UP

## 2019-01-20 LAB
CULTURE RESULTS: SIGNIFICANT CHANGE UP
SPECIMEN SOURCE: SIGNIFICANT CHANGE UP

## 2019-01-23 DIAGNOSIS — M17.12 UNILATERAL PRIMARY OSTEOARTHRITIS, LEFT KNEE: ICD-10-CM

## 2019-01-23 DIAGNOSIS — Z79.84 LONG TERM (CURRENT) USE OF ORAL HYPOGLYCEMIC DRUGS: ICD-10-CM

## 2019-01-23 DIAGNOSIS — Z91.013 ALLERGY TO SEAFOOD: ICD-10-CM

## 2019-01-23 DIAGNOSIS — E11.9 TYPE 2 DIABETES MELLITUS WITHOUT COMPLICATIONS: ICD-10-CM
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
## 2019-01-23 DIAGNOSIS — T84.093A OTHER MECHANICAL COMPLICATION OF INTERNAL LEFT KNEE PROSTHESIS, INITIAL ENCOUNTER: ICD-10-CM

## 2019-01-23 DIAGNOSIS — I10 ESSENTIAL (PRIMARY) HYPERTENSION: ICD-10-CM
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
## 2019-01-23 DIAGNOSIS — M10.9 GOUT, UNSPECIFIED: ICD-10-CM

## 2019-02-06 ENCOUNTER — APPOINTMENT (OUTPATIENT)
Dept: ORTHOPEDIC SURGERY | Facility: CLINIC | Age: 64
End: 2019-02-06
Payer: COMMERCIAL

## 2019-02-06 DIAGNOSIS — M25.662 STIFFNESS OF LEFT KNEE, NOT ELSEWHERE CLASSIFIED: ICD-10-CM

## 2019-02-06 PROBLEM — E11.9 TYPE 2 DIABETES MELLITUS WITHOUT COMPLICATIONS: Chronic | Status: ACTIVE | Noted: 2019-01-04

## 2019-02-06 PROCEDURE — 99024 POSTOP FOLLOW-UP VISIT: CPT

## 2019-02-06 NOTE — PROCEDURE
[de-identified] : Left Knee Revision TKR staple removed completely\par  Observation on incision dry, clean, intact, well healed. Suture site Cleaned with iodine swab after sutures are completely removed. Instructions Keep incision dry and clean, allowed to shower and pat site dry, do not rub dry, contact office is site becomes red, swollen, infected, or you develop a fever.\par

## 2019-02-06 NOTE — HISTORY OF PRESENT ILLNESS
[Doing Well] : is doing well [Staples Removed] : staples were removed [de-identified] : S/P Left Knee Revision TKR [de-identified] : Patient seen today 3 weeks S/P Left Revision TKR for staple removal. Patient states he is doing well, undergoing Pt 3 times a week, on tylenol and percocet for pain and aspirin for anticoagulation.   I went over the patients Or report and he received a copy of the report for his record. [de-identified] : Left Knee rom  [de-identified] : Continue Pt, Aspirin for anticoagulation, cyclobenzaprine for muscle relaxer, tylenol and percocet for pain and follow up in 3-4 weeks with left knee xray

## 2019-02-13 ENCOUNTER — APPOINTMENT (OUTPATIENT)
Dept: CT IMAGING | Facility: CLINIC | Age: 64
End: 2019-02-13
Payer: COMMERCIAL

## 2019-02-13 ENCOUNTER — OUTPATIENT (OUTPATIENT)
Dept: OUTPATIENT SERVICES | Facility: HOSPITAL | Age: 64
LOS: 1 days | End: 2019-02-13
Payer: COMMERCIAL

## 2019-02-13 DIAGNOSIS — Z00.8 ENCOUNTER FOR OTHER GENERAL EXAMINATION: ICD-10-CM

## 2019-02-13 DIAGNOSIS — Z96.653 PRESENCE OF ARTIFICIAL KNEE JOINT, BILATERAL: Chronic | ICD-10-CM

## 2019-02-13 DIAGNOSIS — Z98.89 OTHER SPECIFIED POSTPROCEDURAL STATES: Chronic | ICD-10-CM

## 2019-02-13 PROCEDURE — 74176 CT ABD & PELVIS W/O CONTRAST: CPT

## 2019-02-13 PROCEDURE — 74176 CT ABD & PELVIS W/O CONTRAST: CPT | Mod: 26

## 2019-03-04 ENCOUNTER — OUTPATIENT (OUTPATIENT)
Dept: OUTPATIENT SERVICES | Facility: HOSPITAL | Age: 64
LOS: 1 days | End: 2019-03-04
Payer: COMMERCIAL

## 2019-03-04 VITALS
HEART RATE: 78 BPM | WEIGHT: 220.46 LBS | OXYGEN SATURATION: 96 % | RESPIRATION RATE: 18 BRPM | SYSTOLIC BLOOD PRESSURE: 147 MMHG | DIASTOLIC BLOOD PRESSURE: 78 MMHG | HEIGHT: 74 IN | TEMPERATURE: 99 F

## 2019-03-04 DIAGNOSIS — N21.0 CALCULUS IN BLADDER: ICD-10-CM

## 2019-03-04 DIAGNOSIS — Z96.653 PRESENCE OF ARTIFICIAL KNEE JOINT, BILATERAL: Chronic | ICD-10-CM

## 2019-03-04 DIAGNOSIS — Z01.818 ENCOUNTER FOR OTHER PREPROCEDURAL EXAMINATION: ICD-10-CM

## 2019-03-04 DIAGNOSIS — I10 ESSENTIAL (PRIMARY) HYPERTENSION: ICD-10-CM

## 2019-03-04 DIAGNOSIS — Z98.89 OTHER SPECIFIED POSTPROCEDURAL STATES: Chronic | ICD-10-CM

## 2019-03-04 DIAGNOSIS — R33.9 RETENTION OF URINE, UNSPECIFIED: ICD-10-CM

## 2019-03-04 DIAGNOSIS — E11.9 TYPE 2 DIABETES MELLITUS WITHOUT COMPLICATIONS: ICD-10-CM

## 2019-03-04 LAB
ANION GAP SERPL CALC-SCNC: 15 MMOL/L — SIGNIFICANT CHANGE UP (ref 5–17)
APPEARANCE UR: CLEAR — SIGNIFICANT CHANGE UP
BILIRUB UR-MCNC: NEGATIVE — SIGNIFICANT CHANGE UP
BUN SERPL-MCNC: 18 MG/DL — SIGNIFICANT CHANGE UP (ref 7–23)
CALCIUM SERPL-MCNC: 9.9 MG/DL — SIGNIFICANT CHANGE UP (ref 8.4–10.5)
CHLORIDE SERPL-SCNC: 101 MMOL/L — SIGNIFICANT CHANGE UP (ref 96–108)
CO2 SERPL-SCNC: 25 MMOL/L — SIGNIFICANT CHANGE UP (ref 22–31)
COLOR SPEC: YELLOW — SIGNIFICANT CHANGE UP
CREAT SERPL-MCNC: 0.79 MG/DL — SIGNIFICANT CHANGE UP (ref 0.5–1.3)
DIFF PNL FLD: ABNORMAL
GLUCOSE SERPL-MCNC: 156 MG/DL — HIGH (ref 70–99)
GLUCOSE UR QL: SIGNIFICANT CHANGE UP
HCT VFR BLD CALC: 42.4 % — SIGNIFICANT CHANGE UP (ref 39–50)
HGB BLD-MCNC: 13.5 G/DL — SIGNIFICANT CHANGE UP (ref 13–17)
KETONES UR-MCNC: SIGNIFICANT CHANGE UP
LEUKOCYTE ESTERASE UR-ACNC: NEGATIVE — SIGNIFICANT CHANGE UP
MCHC RBC-ENTMCNC: 30.8 PG — SIGNIFICANT CHANGE UP (ref 27–34)
MCHC RBC-ENTMCNC: 31.8 GM/DL — LOW (ref 32–36)
MCV RBC AUTO: 96.8 FL — SIGNIFICANT CHANGE UP (ref 80–100)
NITRITE UR-MCNC: NEGATIVE — SIGNIFICANT CHANGE UP
PH UR: 5.5 — SIGNIFICANT CHANGE UP (ref 5–8)
PLATELET # BLD AUTO: 340 K/UL — SIGNIFICANT CHANGE UP (ref 150–400)
POTASSIUM SERPL-MCNC: 3.6 MMOL/L — SIGNIFICANT CHANGE UP (ref 3.5–5.3)
POTASSIUM SERPL-SCNC: 3.6 MMOL/L — SIGNIFICANT CHANGE UP (ref 3.5–5.3)
PROT UR-MCNC: SIGNIFICANT CHANGE UP
RBC # BLD: 4.38 M/UL — SIGNIFICANT CHANGE UP (ref 4.2–5.8)
RBC # FLD: 13.2 % — SIGNIFICANT CHANGE UP (ref 10.3–14.5)
SODIUM SERPL-SCNC: 141 MMOL/L — SIGNIFICANT CHANGE UP (ref 135–145)
SP GR SPEC: 1.04 — HIGH (ref 1.01–1.02)
UROBILINOGEN FLD QL: SIGNIFICANT CHANGE UP
WBC # BLD: 9.4 K/UL — SIGNIFICANT CHANGE UP (ref 3.8–10.5)
WBC # FLD AUTO: 9.4 K/UL — SIGNIFICANT CHANGE UP (ref 3.8–10.5)

## 2019-03-04 PROCEDURE — 85027 COMPLETE CBC AUTOMATED: CPT

## 2019-03-04 PROCEDURE — G0463: CPT

## 2019-03-04 PROCEDURE — 87086 URINE CULTURE/COLONY COUNT: CPT

## 2019-03-04 PROCEDURE — 83036 HEMOGLOBIN GLYCOSYLATED A1C: CPT

## 2019-03-04 PROCEDURE — 81001 URINALYSIS AUTO W/SCOPE: CPT

## 2019-03-04 PROCEDURE — 80048 BASIC METABOLIC PNL TOTAL CA: CPT

## 2019-03-04 RX ORDER — CEFAZOLIN SODIUM 1 G
2000 VIAL (EA) INJECTION ONCE
Qty: 0 | Refills: 0 | Status: DISCONTINUED | OUTPATIENT
Start: 2019-03-18 | End: 2019-04-02

## 2019-03-04 RX ORDER — SODIUM CHLORIDE 9 MG/ML
3 INJECTION INTRAMUSCULAR; INTRAVENOUS; SUBCUTANEOUS EVERY 8 HOURS
Qty: 0 | Refills: 0 | Status: DISCONTINUED | OUTPATIENT
Start: 2019-03-18 | End: 2019-03-18

## 2019-03-04 RX ORDER — FERROUS SULFATE 325(65) MG
65 TABLET ORAL
Qty: 0 | Refills: 0 | COMMUNITY

## 2019-03-04 NOTE — H&P PST ADULT - HISTORY OF PRESENT ILLNESS
This is a 62 y/o male with frequent This is a 64 y/o male with history of osteoarthritis, HTN, DM, hiatal hernia, BPH, ureteral calculus,  c/o  frequent  UTI, seen urologist for evaluation cystoscopy revealed + bladder stone, pt also c/o nocturia,  he presents today for surgery

## 2019-03-04 NOTE — H&P PST ADULT - PMH
BPH (benign prostatic hypertrophy)    Diabetes    Essential hypertension    Hepatitis A  resolved  Osteoarthritis of both knees, unspecified osteoarthritis type    Ureteral calculus  2013 - passed on own - takes allopurinol BPH (benign prostatic hypertrophy)    Diabetes    Essential hypertension    Hepatitis A  resolved  Hiatal hernia    Osteoarthritis of both knees, unspecified osteoarthritis type    Ureteral calculus  2013 - passed on own - takes allopurinol

## 2019-03-04 NOTE — H&P PST ADULT - PROBLEM SELECTOR PLAN 1
cystoscopy  photoselective vaporization prostate greenlight laser  cystoscopy holmium laser bladder stone

## 2019-03-05 LAB
CULTURE RESULTS: SIGNIFICANT CHANGE UP
HBA1C BLD-MCNC: 5.9 % — HIGH (ref 4–5.6)
SPECIMEN SOURCE: SIGNIFICANT CHANGE UP

## 2019-03-06 ENCOUNTER — APPOINTMENT (OUTPATIENT)
Dept: ORTHOPEDIC SURGERY | Facility: CLINIC | Age: 64
End: 2019-03-06
Payer: COMMERCIAL

## 2019-03-06 DIAGNOSIS — Z96.652 AFTERCARE FOLLOWING JOINT REPLACEMENT SURGERY: ICD-10-CM

## 2019-03-06 DIAGNOSIS — Z47.1 AFTERCARE FOLLOWING JOINT REPLACEMENT SURGERY: ICD-10-CM

## 2019-03-06 PROCEDURE — 73562 X-RAY EXAM OF KNEE 3: CPT | Mod: LT

## 2019-03-06 PROCEDURE — 73560 X-RAY EXAM OF KNEE 1 OR 2: CPT | Mod: RT

## 2019-03-06 PROCEDURE — 99024 POSTOP FOLLOW-UP VISIT: CPT

## 2019-03-06 NOTE — ADDENDUM
[FreeTextEntry1] : This note was written by Lois Malcolm on 03/06/2019 acting as scribe for Dr. Roby Daniels M.D.\par \par I, Dr. Roby Daniels M.D., have read and attest that all the information, medical decision making and discharge instructions within are true and accurate.\par

## 2019-03-06 NOTE — DISCUSSION/SUMMARY
[de-identified] : Patient is doing well following their left revision TKR at 6 weeks. They will continue PT focusing on extension and activities as tolerated. Patient will follow up with x-rays in 6-8 weeks.

## 2019-03-06 NOTE — HISTORY OF PRESENT ILLNESS
[de-identified] : 62 y/o male presents for follow up evaluation s/p left revision TKR at 6 weeks. Patient is doing well and working with PT. He has been progressing with his motion and and is happy with his progress. Patient reports that he has good flexion of the knee and is working on extension. Patient denies taking any medications, and has discontinued Oxycodone and Cyclobenzaprine. Of note, he recently developed blockage of his urethra causing urinary retention, and needs laser removal of the stones in 2 weeks. He would like to return to work at this time.

## 2019-03-06 NOTE — PHYSICAL EXAM
[de-identified] : Left Knee\par Inspection: mild diffuse soft tissue swelling \par Wounds: healed midline incision\par Alignment: normal.\par Palpation: no specific tenderness on palpation.\par ROM: Active (in degrees) 0-95 almost full extension\par Ligamentous laxity (neg): negative ant. drawer test, negative post. drawer test, stable to varus stress test, stable to valgus stress test,\par Patellofemoral Alignment Test: Q angle-, normal.\par Muscle Test: good quad strength.\par Leg examination: calf is soft and non-tender. [de-identified] : Left knee xrays, taken at the office today show:,  AP, lateral, merchant view demonstrates a revision total knee replacement in satisfactory position and alignment. No evidence of loosening. The patella sits in a central position \par \par Right knee xray merchant view demonstrates a total knee replacement in satisfactory position and alignment with the patella in a central position

## 2019-03-07 PROBLEM — K44.9 DIAPHRAGMATIC HERNIA WITHOUT OBSTRUCTION OR GANGRENE: Chronic | Status: ACTIVE | Noted: 2019-03-04

## 2019-03-18 ENCOUNTER — RESULT REVIEW (OUTPATIENT)
Age: 64
End: 2019-03-18

## 2019-03-18 ENCOUNTER — OUTPATIENT (OUTPATIENT)
Dept: OUTPATIENT SERVICES | Facility: HOSPITAL | Age: 64
LOS: 1 days | End: 2019-03-18
Payer: COMMERCIAL

## 2019-03-18 VITALS
RESPIRATION RATE: 18 BRPM | SYSTOLIC BLOOD PRESSURE: 125 MMHG | HEIGHT: 74 IN | DIASTOLIC BLOOD PRESSURE: 82 MMHG | OXYGEN SATURATION: 98 % | TEMPERATURE: 98 F | WEIGHT: 220.46 LBS | HEART RATE: 77 BPM

## 2019-03-18 VITALS
DIASTOLIC BLOOD PRESSURE: 76 MMHG | TEMPERATURE: 98 F | SYSTOLIC BLOOD PRESSURE: 133 MMHG | HEART RATE: 89 BPM | OXYGEN SATURATION: 97 % | RESPIRATION RATE: 16 BRPM

## 2019-03-18 DIAGNOSIS — N21.0 CALCULUS IN BLADDER: ICD-10-CM

## 2019-03-18 DIAGNOSIS — Z96.653 PRESENCE OF ARTIFICIAL KNEE JOINT, BILATERAL: Chronic | ICD-10-CM

## 2019-03-18 DIAGNOSIS — R33.9 RETENTION OF URINE, UNSPECIFIED: ICD-10-CM

## 2019-03-18 DIAGNOSIS — Z98.89 OTHER SPECIFIED POSTPROCEDURAL STATES: Chronic | ICD-10-CM

## 2019-03-18 LAB
BLD GP AB SCN SERPL QL: NEGATIVE — SIGNIFICANT CHANGE UP
GLUCOSE BLDC GLUCOMTR-MCNC: 120 MG/DL — HIGH (ref 70–99)
RH IG SCN BLD-IMP: POSITIVE — SIGNIFICANT CHANGE UP

## 2019-03-18 PROCEDURE — 52648 LASER SURGERY OF PROSTATE: CPT

## 2019-03-18 PROCEDURE — C1889: CPT

## 2019-03-18 PROCEDURE — 86900 BLOOD TYPING SEROLOGIC ABO: CPT

## 2019-03-18 PROCEDURE — 86850 RBC ANTIBODY SCREEN: CPT

## 2019-03-18 PROCEDURE — 88305 TISSUE EXAM BY PATHOLOGIST: CPT | Mod: 26

## 2019-03-18 PROCEDURE — 52317 REMOVE BLADDER STONE: CPT

## 2019-03-18 PROCEDURE — 86901 BLOOD TYPING SEROLOGIC RH(D): CPT

## 2019-03-18 PROCEDURE — 88300 SURGICAL PATH GROSS: CPT | Mod: 26

## 2019-03-18 PROCEDURE — 82962 GLUCOSE BLOOD TEST: CPT

## 2019-03-18 PROCEDURE — 82365 CALCULUS SPECTROSCOPY: CPT

## 2019-03-18 PROCEDURE — 88305 TISSUE EXAM BY PATHOLOGIST: CPT

## 2019-03-18 PROCEDURE — 88300 SURGICAL PATH GROSS: CPT

## 2019-03-18 RX ORDER — HYDROMORPHONE HYDROCHLORIDE 2 MG/ML
0.5 INJECTION INTRAMUSCULAR; INTRAVENOUS; SUBCUTANEOUS
Qty: 0 | Refills: 0 | Status: DISCONTINUED | OUTPATIENT
Start: 2019-03-18 | End: 2019-03-18

## 2019-03-18 RX ORDER — ONDANSETRON 8 MG/1
4 TABLET, FILM COATED ORAL EVERY 8 HOURS
Qty: 0 | Refills: 0 | Status: DISCONTINUED | OUTPATIENT
Start: 2019-03-18 | End: 2019-03-18

## 2019-03-18 RX ORDER — ALLOPURINOL 300 MG
1 TABLET ORAL
Qty: 0 | Refills: 0 | COMMUNITY

## 2019-03-18 RX ORDER — FINASTERIDE 5 MG/1
1 TABLET, FILM COATED ORAL
Qty: 0 | Refills: 0 | COMMUNITY

## 2019-03-18 RX ORDER — SODIUM CHLORIDE 9 MG/ML
1000 INJECTION, SOLUTION INTRAVENOUS
Qty: 0 | Refills: 0 | Status: DISCONTINUED | OUTPATIENT
Start: 2019-03-18 | End: 2019-03-18

## 2019-03-18 RX ORDER — TAMSULOSIN HYDROCHLORIDE 0.4 MG/1
1 CAPSULE ORAL
Qty: 0 | Refills: 0 | COMMUNITY

## 2019-03-18 RX ORDER — METFORMIN HYDROCHLORIDE 850 MG/1
1 TABLET ORAL
Qty: 0 | Refills: 0 | COMMUNITY

## 2019-03-18 RX ADMIN — HYDROMORPHONE HYDROCHLORIDE 0.5 MILLIGRAM(S): 2 INJECTION INTRAMUSCULAR; INTRAVENOUS; SUBCUTANEOUS at 11:07

## 2019-03-18 RX ADMIN — HYDROMORPHONE HYDROCHLORIDE 0.5 MILLIGRAM(S): 2 INJECTION INTRAMUSCULAR; INTRAVENOUS; SUBCUTANEOUS at 11:06

## 2019-03-18 RX ADMIN — SODIUM CHLORIDE 100 MILLILITER(S): 9 INJECTION, SOLUTION INTRAVENOUS at 12:04

## 2019-03-18 NOTE — ASU DISCHARGE PLAN (ADULT/PEDIATRIC) - CALL YOUR DOCTOR IF YOU HAVE ANY OF THE FOLLOWING:
Bleeding that does not stop/Fever greater than (need to indicate Fahrenheit or Celsius)/Nausea and vomiting that does not stop/Unable to urinate

## 2019-03-18 NOTE — ASU DISCHARGE PLAN (ADULT/PEDIATRIC) - CARE PROVIDER_API CALL
John Chen)  Urology  [address] [place] [address], [address]  [place], [place] [id number]  Phone: [phone]  Fax: [phone]  Follow Up Time:

## 2019-03-20 LAB — SURGICAL PATHOLOGY STUDY: SIGNIFICANT CHANGE UP

## 2019-03-21 LAB — COMPN STONE: SIGNIFICANT CHANGE UP

## 2019-04-10 ENCOUNTER — APPOINTMENT (OUTPATIENT)
Dept: ORTHOPEDIC SURGERY | Facility: CLINIC | Age: 64
End: 2019-04-10
Payer: COMMERCIAL

## 2019-04-10 PROCEDURE — 73560 X-RAY EXAM OF KNEE 1 OR 2: CPT | Mod: RT

## 2019-04-10 PROCEDURE — 73562 X-RAY EXAM OF KNEE 3: CPT | Mod: LT

## 2019-04-10 PROCEDURE — 99024 POSTOP FOLLOW-UP VISIT: CPT

## 2019-04-10 RX ORDER — BLOOD-GLUCOSE METER
KIT MISCELLANEOUS
Qty: 1 | Refills: 0 | Status: ACTIVE | COMMUNITY
Start: 2018-12-08

## 2019-04-10 NOTE — ADDENDUM
[FreeTextEntry1] : This note was written by Lois Malcolm on 04/10/2019 acting as scribe for Dr. Roby Daniels M.D.\par \par I, Dr. Roby Daniels M.D., have read and attest that all the information, medical decision making and discharge instructions within are true and accurate.\par

## 2019-04-10 NOTE — HISTORY OF PRESENT ILLNESS
[de-identified] : 64 y/o male presents for follow up evaluation s/p left revision TKR at 3 months. Patient is doing well and has made good progress with PT. He states he had a procedure done to address kidney stones on 3/18/2019, and was not able to do PT due to a minor setback. Patient denies taking any medications at this time. He would like a letter to return to work on 4/19/2019, a medical alert card, and a prescription to return to PT.

## 2019-04-10 NOTE — DISCUSSION/SUMMARY
[de-identified] : Patient is doing well following their left revision TKR at 3 months. They will continue PT and activities as tolerated. He will return to work on 4/19/2019. Patient will follow up with x-rays in 3 months.

## 2019-04-15 ENCOUNTER — FORM ENCOUNTER (OUTPATIENT)
Age: 64
End: 2019-04-15

## 2019-08-07 ENCOUNTER — APPOINTMENT (OUTPATIENT)
Dept: ORTHOPEDIC SURGERY | Facility: CLINIC | Age: 64
End: 2019-08-07
Payer: COMMERCIAL

## 2019-08-07 DIAGNOSIS — Z96.652 PRESENCE OF LEFT ARTIFICIAL KNEE JOINT: ICD-10-CM

## 2019-08-07 DIAGNOSIS — M25.561 PAIN IN RIGHT KNEE: ICD-10-CM

## 2019-08-07 DIAGNOSIS — Z96.653 PRESENCE OF ARTIFICIAL KNEE JOINT, BILATERAL: ICD-10-CM

## 2019-08-07 DIAGNOSIS — M25.661 STIFFNESS OF RIGHT KNEE, NOT ELSEWHERE CLASSIFIED: ICD-10-CM

## 2019-08-07 PROCEDURE — 73562 X-RAY EXAM OF KNEE 3: CPT | Mod: 50

## 2019-08-07 PROCEDURE — 99213 OFFICE O/P EST LOW 20 MIN: CPT

## 2019-08-07 NOTE — PHYSICAL EXAM
[de-identified] : Left Knee\par Inspection: no effusion\par Wounds: healed midline incision\par Alignment: normal.\par Palpation: no specific tenderness on palpation.\par ROM: Active (in degrees) 0-100\par Ligamentous laxity (neg): negative ant. drawer test, negative post. drawer test, stable to varus stress test, stable to valgus stress test,\par Patellofemoral Alignment Test: Q angle-, normal.\par Muscle Test: good quad strength.\par Leg examination: calf is soft and non-tender.\par \par \par Right knee\par Inspection: no effusion or erythema.\par Wounds: healed midline incision \par Alignment: normal.\par Palpation: no specific tenderness on palpation.\par ROM: Active (in degrees) 10-90 with patellofemoral crepitus \par Ligamentous laxity (neg): all ligaments appear stable, negative ant. drawer test, negative post. drawer test, stable to varus stress test, stable to valgus stress test, \par Patellofemoral Alignment Test: Q angle-, normal.\par Muscle Test: good quad strength.\par Leg examination: calf is soft and non-tender. [de-identified] : Left knee xrays, taken at the office today show:,  AP, lateral, merchant view demonstrates a revision total knee replacement in satisfactory position and alignment. No evidence of loosening. The patella sits in a central position \par \par Right knee AP, lateral, merchant view demonstrates a total knee replacement in satisfactory position and alignment. No evidence of loosening. Appears to be in varus but may be due to flexion contracture, slight lateral tilt to patella.

## 2019-08-07 NOTE — HISTORY OF PRESENT ILLNESS
[de-identified] : 63 y/o male presents for follow up evaluation s/p left revision TKR at 6 months. Patient is doing well and has no issues or complaints. He is reporting right knee pain at this time following his right TKR. He states he has some stiffness and swelling with limited motion, but is not yet ready to undergo revision.

## 2019-08-07 NOTE — DISCUSSION/SUMMARY
[de-identified] : Patient is doing well following their left revision TKR at 6 months and is very happy. They can continue activities as tolerated.  His left knee has mild arthrofibrosis but is functional. I do not think surgery is warranted at this time. I encouraged him to continue activities as tolerated. Patient will follow up with x-rays of both knees in 6 months.

## 2019-08-07 NOTE — ADDENDUM
[FreeTextEntry1] : This note was written by Lois Malcolm on 08/07/2019 acting as scribe for Dr. Roby Daniels M.D.\par \par I, Dr. Roby Daniels M.D., have read and attest that all the information, medical decision making and discharge instructions within are true and accurate.\par

## 2020-02-19 ENCOUNTER — APPOINTMENT (OUTPATIENT)
Dept: ORTHOPEDIC SURGERY | Facility: CLINIC | Age: 65
End: 2020-02-19
Payer: COMMERCIAL

## 2020-02-19 DIAGNOSIS — Z96.651 PRESENCE OF RIGHT ARTIFICIAL KNEE JOINT: ICD-10-CM

## 2020-02-19 DIAGNOSIS — M17.0 BILATERAL PRIMARY OSTEOARTHRITIS OF KNEE: ICD-10-CM

## 2020-02-19 DIAGNOSIS — S80.01XA CONTUSION OF RIGHT KNEE, INITIAL ENCOUNTER: ICD-10-CM

## 2020-02-19 PROCEDURE — 73562 X-RAY EXAM OF KNEE 3: CPT | Mod: RT

## 2020-02-19 PROCEDURE — 73560 X-RAY EXAM OF KNEE 1 OR 2: CPT | Mod: LT

## 2020-02-19 PROCEDURE — 99213 OFFICE O/P EST LOW 20 MIN: CPT

## 2020-02-19 NOTE — DISCUSSION/SUMMARY
[de-identified] : Discussed at length the nature of the patients condition. His right TKR is doing well. He has a resolving contusion. I have reassured him that his implants are functioning well and there are no mechanical issues. I reviewed x-ray films with them. They can continue activities as tolerated. Patient may follow up with x-rays of both knees in 6 months.

## 2020-02-19 NOTE — PHYSICAL EXAM
[de-identified] : Right knee\par Inspection: no effusion or erythema.\par Wounds: healed midline incision \par Alignment: normal.\par Palpation: no specific tenderness over Gerdy's Tubercle on the patella tendon on palpation.\par ROM: Active (in degrees) 0-100\par Ligamentous laxity (neg): all ligaments appear stable, negative ant. drawer test, negative post. drawer test, stable to varus stress test, stable to valgus stress test, \par Patellofemoral Alignment Test: Q angle-, normal.\par Muscle Test: good quad strength.\par Leg examination: calf is soft and non-tender. [de-identified] : Left knee xray merchant view taken at the office today demonstrates a total knee replacement in satisfactory position and alignment with the patella in a central position \par \par Right knee AP, lateral, merchant view demonstrates a total knee replacement in satisfactory position and alignment. No evidence of loosening. Appears to be in varus but may be due to flexion contracture, slight lateral tilt to patella.

## 2020-02-19 NOTE — ADDENDUM
[FreeTextEntry1] : This note was written by Lois Malcolm on 02/19/2020 acting as scribe for Dr. Roby Daniels M.D.\par \par I, Dr. Roby Daniels M.D., have read and attest that all the information, medical decision making and discharge instructions within are true and accurate.

## 2020-04-25 ENCOUNTER — MESSAGE (OUTPATIENT)
Age: 65
End: 2020-04-25

## 2020-04-30 LAB
SARS-COV-2 IGG SERPL IA-ACNC: 0 INDEX
SARS-COV-2 IGG SERPL QL IA: NEGATIVE

## 2020-05-14 NOTE — DISCHARGE NOTE ADULT - NURSING SECTION COMPLETE
S: Pt presents for routine OB follow up.  No contractions, vaginal bleeding, or leaking fluids. Good fetal movement.    Questions answered.    O: /78   Wt 88.9 kg (196 lb)   LMP 2019 (Approximate)   BMI 34.72 kg/m²   Patients' weight gain, fluid intake and exercise level discussed.  Vitals, fundal height , fetal position, and FHR reviewed on flowsheet    Lab:No results found for this or any previous visit (from the past 336 hour(s)).    A/P:  25 y.o.  at 36w5d presents for routine obstetric follow-up.  Size equals dates and/or scan  GBS today   Estimated Date of Delivery: 20 by 11wk US  PNL: Rh+/-, RI, pnl wnl  Aneuploidy screening: QS low risk   Anatomy US: wnl    Glucola/3rd tri labs: [x ] wnl  Rhogam: n/a    Tdap: 3/11/2020    GBS [ ]     Hx of SGA baby: growth US  EFW 29%      1.  Continue prenatal vitamins.  2.  Begin kick counts at 28 weeks.  3.  Exercise at least 30 minutes daily.  4.  Drink 1- 2 Liters of water daily  5.  Follow-up in 1 weeks.       Patient/Caregiver provided printed discharge information.

## 2020-07-27 NOTE — ASU PREOP CHECKLIST - TEMPERATURE IN CELSIUS (DEGREES C)
36.3 O-T Plasty Text: The defect edges were debeveled with a #15 scalpel blade.  Given the location of the defect, shape of the defect and the proximity to free margins an O-T plasty was deemed most appropriate.  Using a sterile surgical marker, an appropriate O-T plasty was drawn incorporating the defect and placing the expected incisions within the relaxed skin tension lines where possible.    The area thus outlined was incised deep to adipose tissue with a #15 scalpel blade.  The skin margins were undermined to an appropriate distance in all directions utilizing iris scissors.

## 2020-08-19 ENCOUNTER — APPOINTMENT (OUTPATIENT)
Dept: ORTHOPEDIC SURGERY | Facility: CLINIC | Age: 65
End: 2020-08-19

## 2020-10-06 NOTE — PHYSICAL EXAM
Notified of message above from . Verbalized understanding.   [de-identified] : Left Knee\par Inspection: mild diffuse soft tissue swelling \par Wounds: healed midline incision\par Alignment: normal.\par Palpation: no specific tenderness on palpation.\par ROM: Active (in degrees) 0-100 almost full extension\par Ligamentous laxity (neg): negative ant. drawer test, negative post. drawer test, stable to varus stress test, stable to valgus stress test,\par Patellofemoral Alignment Test: Q angle-, normal.\par Muscle Test: good quad strength.\par Leg examination: calf is soft and non-tender. [de-identified] : Left knee xrays, taken at the office today show:,  AP, lateral, merchant view demonstrates a revision total knee replacement in satisfactory position and alignment. No evidence of loosening. The patella sits in a central position \par \par Right knee xray merchant view demonstrates a total knee replacement in satisfactory position and alignment with the patella in a central position

## 2021-10-04 ENCOUNTER — TRANSCRIPTION ENCOUNTER (OUTPATIENT)
Age: 66
End: 2021-10-04

## 2021-10-07 NOTE — ASU DISCHARGE PLAN (ADULT/PEDIATRIC) - COMMENTS
Pt last saw PCP 6/29/21 for a diabetes f/u appt. Pending appt 1/13/22 [facility].    metFORMIN (GLUCOPHAGE-XR) 500 MG 24 hr tablets last ordered 3/23/21 #180 tablets R1  Take 1 tablet by mouth 2 times daily.    Per last diabetes f/u visit notes on 6/29/21:   \"ASSESSMENT AND PLAN:  1. Type 2 diabetes mellitus with hyperglycemia, without long-term current use of insulin (CMS/[facility])  -still not well controlled with recent A1c 8.3 but much improved over the last six months, A1c had been 11.4 in December 2020  -increase to Glimepiride 4 mg BID  -continue Metformin 500 mg BID, Farxiga 10 mg daily  -recheck labs in 6 months  -encouraged mom to schedule her diabetic eye exam\"    Metformin refilled adhering to standing orders. Pt is current with visits.  
Follow up for paul catheter removal this Wednesday 3/20/19

## 2022-07-25 ENCOUNTER — APPOINTMENT (OUTPATIENT)
Dept: MRI IMAGING | Facility: CLINIC | Age: 67
End: 2022-07-25

## 2022-07-25 ENCOUNTER — OUTPATIENT (OUTPATIENT)
Dept: OUTPATIENT SERVICES | Facility: HOSPITAL | Age: 67
LOS: 1 days | End: 2022-07-25
Payer: COMMERCIAL

## 2022-07-25 DIAGNOSIS — Z96.653 PRESENCE OF ARTIFICIAL KNEE JOINT, BILATERAL: Chronic | ICD-10-CM

## 2022-07-25 DIAGNOSIS — Z98.89 OTHER SPECIFIED POSTPROCEDURAL STATES: Chronic | ICD-10-CM

## 2022-07-25 DIAGNOSIS — Z00.8 ENCOUNTER FOR OTHER GENERAL EXAMINATION: ICD-10-CM

## 2022-07-25 PROCEDURE — 72197 MRI PELVIS W/O & W/DYE: CPT | Mod: 26

## 2022-07-25 PROCEDURE — 76498 UNLISTED MR PROCEDURE: CPT

## 2022-07-25 PROCEDURE — 76498P: CUSTOM | Mod: 26

## 2022-07-25 PROCEDURE — 72197 MRI PELVIS W/O & W/DYE: CPT

## 2022-07-25 PROCEDURE — A9585: CPT

## 2022-08-25 ENCOUNTER — RESULT REVIEW (OUTPATIENT)
Age: 67
End: 2022-08-25

## 2022-08-29 NOTE — OCCUPATIONAL THERAPY INITIAL EVALUATION ADULT - LEVEL OF INDEPENDENCE: CHAIR TO BED, REHAB EVAL
[facility])  -     
([address]    Gastroesophageal reflux disease    COPD (chronic obstructive pulmonary disease) ([facility])    Psoriasis    Depression    Allergic sinusitis    Pericardial effusion    Exertional dyspnea    Hypertrophic cardiomyopathy ([address]    Primary lung cancer, left ([facility])    [address] [place] [address] [id number] [place] [address] [phone] Donnice Frankel [phone]  [address] [place] [id number]      Phone: [phone]
Wei Randolph MD
bed bound
supervision

## 2022-09-09 ENCOUNTER — APPOINTMENT (OUTPATIENT)
Dept: NUCLEAR MEDICINE | Facility: IMAGING CENTER | Age: 67
End: 2022-09-09

## 2022-09-09 ENCOUNTER — OUTPATIENT (OUTPATIENT)
Dept: OUTPATIENT SERVICES | Facility: HOSPITAL | Age: 67
LOS: 1 days | End: 2022-09-09
Payer: COMMERCIAL

## 2022-09-09 DIAGNOSIS — Z96.653 PRESENCE OF ARTIFICIAL KNEE JOINT, BILATERAL: Chronic | ICD-10-CM

## 2022-09-09 DIAGNOSIS — Z98.89 OTHER SPECIFIED POSTPROCEDURAL STATES: Chronic | ICD-10-CM

## 2022-09-09 DIAGNOSIS — Z00.8 ENCOUNTER FOR OTHER GENERAL EXAMINATION: ICD-10-CM

## 2022-09-09 PROCEDURE — 78306 BONE IMAGING WHOLE BODY: CPT | Mod: 26

## 2022-09-09 PROCEDURE — A9561: CPT

## 2022-09-09 PROCEDURE — 78306 BONE IMAGING WHOLE BODY: CPT

## 2022-09-21 ENCOUNTER — OUTPATIENT (OUTPATIENT)
Dept: OUTPATIENT SERVICES | Facility: HOSPITAL | Age: 67
LOS: 1 days | Discharge: HOME | End: 2022-09-21
Payer: COMMERCIAL

## 2022-09-21 DIAGNOSIS — Z96.653 PRESENCE OF ARTIFICIAL KNEE JOINT, BILATERAL: Chronic | ICD-10-CM

## 2022-09-21 DIAGNOSIS — Z98.89 OTHER SPECIFIED POSTPROCEDURAL STATES: Chronic | ICD-10-CM

## 2022-09-22 ENCOUNTER — APPOINTMENT (OUTPATIENT)
Dept: RADIATION ONCOLOGY | Facility: CLINIC | Age: 67
End: 2022-09-22

## 2022-09-22 VITALS
TEMPERATURE: 97.34 F | SYSTOLIC BLOOD PRESSURE: 150 MMHG | RESPIRATION RATE: 17 BRPM | WEIGHT: 210 LBS | OXYGEN SATURATION: 97 % | HEART RATE: 87 BPM | BODY MASS INDEX: 26.95 KG/M2 | HEIGHT: 74 IN | DIASTOLIC BLOOD PRESSURE: 77 MMHG

## 2022-09-22 PROCEDURE — 99205 OFFICE O/P NEW HI 60 MIN: CPT | Mod: 25,GC

## 2022-09-22 RX ORDER — CYCLOBENZAPRINE HYDROCHLORIDE 5 MG/1
5 TABLET, FILM COATED ORAL 3 TIMES DAILY
Qty: 30 | Refills: 0 | Status: DISCONTINUED | COMMUNITY
Start: 2019-02-06 | End: 2022-09-22

## 2022-09-22 RX ORDER — OXYCODONE AND ACETAMINOPHEN 5; 325 MG/1; MG/1
5-325 TABLET ORAL
Qty: 60 | Refills: 0 | Status: DISCONTINUED | COMMUNITY
Start: 2019-01-28 | End: 2022-09-22

## 2022-09-22 RX ORDER — CHOLECALCIFEROL (VITAMIN D3) 50 MCG
CAPSULE ORAL
Refills: 0 | Status: ACTIVE | COMMUNITY
Start: 2022-09-22

## 2022-09-22 RX ORDER — AMOXICILLIN 500 MG/1
500 CAPSULE ORAL
Qty: 20 | Refills: 0 | Status: DISCONTINUED | COMMUNITY
Start: 2018-04-05 | End: 2022-09-22

## 2022-09-22 RX ORDER — CELECOXIB 200 MG/1
200 CAPSULE ORAL
Qty: 2 | Refills: 0 | Status: DISCONTINUED | COMMUNITY
Start: 2018-12-26 | End: 2022-09-22

## 2022-09-22 RX ORDER — OSELTAMIVIR PHOSPHATE 75 MG/1
75 CAPSULE ORAL
Qty: 10 | Refills: 0 | Status: DISCONTINUED | COMMUNITY
Start: 2018-02-14 | End: 2022-09-22

## 2022-09-22 RX ORDER — CEFPODOXIME PROXETIL 100 MG/1
100 TABLET, FILM COATED ORAL
Qty: 20 | Refills: 0 | Status: DISCONTINUED | COMMUNITY
Start: 2018-05-17 | End: 2022-09-22

## 2022-09-22 RX ORDER — CEFADROXIL 500 MG/1
500 CAPSULE ORAL
Qty: 10 | Refills: 0 | Status: DISCONTINUED | COMMUNITY
Start: 2019-03-18 | End: 2022-09-22

## 2022-09-22 RX ORDER — METFORMIN HYDROCHLORIDE 500 MG/1
500 TABLET, COATED ORAL TWICE DAILY
Refills: 0 | Status: ACTIVE | COMMUNITY
Start: 2022-09-22

## 2022-09-22 RX ORDER — LEVOFLOXACIN 500 MG/1
500 TABLET, FILM COATED ORAL
Qty: 7 | Refills: 0 | Status: DISCONTINUED | COMMUNITY
Start: 2019-01-07 | End: 2022-09-22

## 2022-09-22 RX ORDER — TAMSULOSIN HYDROCHLORIDE 0.4 MG/1
0.4 CAPSULE ORAL AT BEDTIME
Refills: 0 | Status: ACTIVE | COMMUNITY
Start: 2017-10-18

## 2022-09-22 RX ORDER — ASPIRIN 325 MG/1
325 TABLET, COATED ORAL
Qty: 60 | Refills: 0 | Status: DISCONTINUED | COMMUNITY
Start: 2019-01-15 | End: 2022-09-22

## 2022-09-23 LAB
ALBUMIN SERPL ELPH-MCNC: 4.8 G/DL
ALP BLD-CCNC: 78 U/L
ALT SERPL-CCNC: 15 U/L
ANION GAP SERPL CALC-SCNC: 12 MMOL/L
AST SERPL-CCNC: 17 U/L
BASOPHILS # BLD AUTO: 0.03 K/UL
BASOPHILS NFR BLD AUTO: 0.3 %
BILIRUB SERPL-MCNC: 0.3 MG/DL
BUN SERPL-MCNC: 18 MG/DL
CALCIUM SERPL-MCNC: 10.4 MG/DL
CHLORIDE SERPL-SCNC: 102 MMOL/L
CO2 SERPL-SCNC: 27 MMOL/L
CREAT SERPL-MCNC: 0.7 MG/DL
EGFR: 100 ML/MIN/1.73M2
EOSINOPHIL # BLD AUTO: 0.1 K/UL
EOSINOPHIL NFR BLD AUTO: 1.1 %
GLUCOSE SERPL-MCNC: 106 MG/DL
HCT VFR BLD CALC: 44.9 %
HGB BLD-MCNC: 14.1 G/DL
IMM GRANULOCYTES NFR BLD AUTO: 0.2 %
LYMPHOCYTES # BLD AUTO: 1.63 K/UL
LYMPHOCYTES NFR BLD AUTO: 17.7 %
MAN DIFF?: NORMAL
MCHC RBC-ENTMCNC: 31.1 PG
MCHC RBC-ENTMCNC: 31.4 GM/DL
MCV RBC AUTO: 98.9 FL
MONOCYTES # BLD AUTO: 0.57 K/UL
MONOCYTES NFR BLD AUTO: 6.2 %
NEUTROPHILS # BLD AUTO: 6.84 K/UL
NEUTROPHILS NFR BLD AUTO: 74.5 %
PLATELET # BLD AUTO: 300 K/UL
POTASSIUM SERPL-SCNC: 4.6 MMOL/L
PROT SERPL-MCNC: 8 G/DL
PSA SERPL-MCNC: 1.19 NG/ML
RBC # BLD: 4.54 M/UL
RBC # FLD: 14.6 %
SODIUM SERPL-SCNC: 142 MMOL/L
TESTOST SERPL-MCNC: 402 NG/DL
WBC # FLD AUTO: 9.19 K/UL

## 2022-09-23 PROCEDURE — 77263 THER RADIOLOGY TX PLNG CPLX: CPT

## 2022-09-23 NOTE — HISTORY OF PRESENT ILLNESS
[FreeTextEntry1] : Cy Juarez is a 66yo man with history of TURP 8/2019 who presents to discuss RT management options for prostate cancer.\par \par PSA history:\par 7/12/22: 1.19 Free psa: 0.16\par 6/29/21: 1.05 Free psa: 0.15\par 2/2019: 2.45 Free psa: 0.5\par 9/2018: 2.44\par 6/2017: 2.36- free psa 0.46\par \par 8/18/19: TURP: path-BPH\par \par 7/25/22: prostate MRI: Right, posterior medial (PZpm), [id number], peripheral zone lesion as detailed above. Suspicion for extracapsular extension.PIRADS 5. No seminal vesicle invasion or pelvic lymphadenopathy. Prostate Volume: 79 mL\par \par 8/25/22: 12 core biopsy:  1 right posterior mid to apex tissue did not survive processing, 4 standard cores and 8 targeted biopsies of right posterior medial midgland to apex PZ lesion\par 3/11 cores positive (1 site), all targeted to lesion, 2 nakita 8, 1 nakita 4+3, all involving 10% of core\par \par 9/9/22: negative bone scan\par \par 9/14: Met with Dr. Chen, urologist, who advised against surgery due to patient age and suggested RT. \par \par Occupation: Works for [employer] in [employer] dept\par \par Today: Feels generally well and offers no acute complaints. Notes intentional 20 lbs weight loss past 5 months  due diet/exercise. Taking finasteride. IPSS/EPIC Score 12/5\par

## 2022-09-23 NOTE — REVIEW OF SYSTEMS
[Recent Change In Weight] : ~T recent weight change [Urinary Frequency] : urinary frequency [Anxiety] : anxiety [Easy Bleeding] : a tendency for easy bleeding [Easy Bruising] : a tendency for easy bruising [Negative] : Integumentary [IPSS Score (0-40): ___] : IPSS score: [unfilled] [EPIC-CP Score (0-60): ___] : EPIC-CP score: [unfilled] [Fever] : no fever [Chills] : no chills [Night Sweats] : no night sweats [Fatigue] : no fatigue [Eye Pain] : no eye pain [Dysphagia] : no dysphagia [Chest Pain] : no chest pain [Palpitations] : no palpitations [Lower Ext Edema] : no lower extremity edema [Shortness Of Breath] : no shortness of breath [Cough] : no cough [Joint Pain] : no joint pain [Disturbance Of Gait] : no gait disturbance [Confused] : no confusion

## 2022-09-23 NOTE — END OF VISIT
[FreeTextEntry3] : I saw and examined this patient on the date of service with my assigned resident physician, Dr. Rafi Sanford. I was involved in all procedures and radiographic assessments. I personally confirmed pertinent history and exam findings and reviewed the patient's diagnosis and plan with them.\par \par 67M w/ high risk prostate cancer, rcT3a G8(4+4) iPSA 1.19 7/12/22. 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 \par \par We had an extensive discussion of the patient's imaging and pathology, and reviewed his MRI, labs, and pathology reports together; I independently evaluated these and discussed their significance with the patient and his wife. We discussed the natural history of prostate cancer and its management. \par \par I believe he would benefit from definitive therapy to the prostate, and I discussed options including surgery, brachytherapy, and external beam radiation therapy (both in combination with brachytherapy and delivered in conventional or hypofractionated course). Given the current information, I would recommend hypofractionated external beam radiation therapy with 2 years ADT optimally (at least 1 year); would recommend cytoreduction first and reevaluate for size reduction and retraction of GAIL. Given size of gland and prior TURP, combination therapy may not be a good option but this can be re-evaluated after cytoreduction. \par \par We discussed the risks and benefits of treatment and the potential acute and late toxicities associated with radiation treatments to the prostate and pelvis. The patient asked a number of questions, all answered to their satisfaction, and wishes to proceed as recommended; we will obtain labs and start casodex and coordinate with Dr. Chen for ADT. Would plan to reevaluate with MRI after 3 months cytoreduction and finalize radiation plan at that time. The patient knows that they may contact us if they have any questions or if new issues arise.

## 2022-09-23 NOTE — HISTORY OF PRESENT ILLNESS
[FreeTextEntry1] : Cy Juarez is a 68yo man with history of TURP 8/2019 who presents to discuss RT management options for prostate cancer.\par \par PSA history:\par 7/12/22: 1.19 Free psa: 0.16\par 6/29/21: 1.05 Free psa: 0.15\par 2/2019: 2.45 Free psa: 0.5\par 9/2018: 2.44\par 6/2017: 2.36- free psa 0.46\par \par 8/18/19: TURP: path-BPH\par \par 7/25/22: prostate MRI: Right, posterior medial (PZpm), [id number], peripheral zone lesion as detailed above. Suspicion for extracapsular extension.PIRADS 5. No seminal vesicle invasion or pelvic lymphadenopathy. Prostate Volume: 79 mL\par \par 8/25/22: 12 core biopsy:  1 right posterior mid to apex tissue did not survive processing, 4 standard cores and 8 targeted biopsies of right posterior medial midgland to apex PZ lesion\par 3/11 cores positive (1 site), all targeted to lesion, 2 nakita 8, 1 nakita 4+3, all involving 10% of core\par \par 9/9/22: negative bone scan\par \par 9/14: Met with Dr. Chen, urologist, who advised against surgery due to patient age and suggested RT. \par \par Occupation: Works for [employer] in [employer] [address] dept\par \par Today: Feels generally well and offers no acute complaints. Notes intentional 20 lbs weight loss past 5 months  due diet/exercise. Taking finasteride. IPSS/EPIC Score 12/5\par

## 2022-09-23 NOTE — END OF VISIT
[FreeTextEntry3] : I saw and examined this patient on the date of service with my assigned resident physician, Dr. Rafi Sanford. I was involved in all procedures and radiographic assessments. I personally confirmed pertinent history and exam findings and reviewed the patient's diagnosis and plan with them.\par \par 67M w/ high risk prostate cancer, rcT3a G8(4+4) iPSA 1.19 7/12/22. 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 \par \par We had an extensive discussion of the patient's imaging and pathology, and reviewed his MRI, labs, and pathology reports together; I independently evaluated these and discussed their significance with the patient and his wife. We discussed the natural history of prostate cancer and its management. \par \par I believe he would benefit from definitive therapy to the prostate, and I discussed options including surgery, brachytherapy, and external beam radiation therapy (both in combination with brachytherapy and delivered in conventional or hypofractionated course). Given the current information, I would recommend hypofractionated external beam radiation therapy with 2 years ADT optimally (at least 1 year); would recommend cytoreduction first and reevaluate for size reduction and retraction of GAIL. Given size of gland and prior TURP, combination therapy may not be a good option but this can be re-evaluated after cytoreduction. \par \par We discussed the risks and benefits of treatment and the potential acute and late toxicities associated with radiation treatments to the prostate and pelvis. The patient asked a number of questions, all answered to their satisfaction, and wishes to proceed as recommended; we will obtain labs and start casodex and coordinate with Dr. hCen for ADT. Would plan to reevaluate with MRI after 3 months cytoreduction and finalize radiation plan at that time. The patient knows that they may contact us if they have any questions or if new issues arise.

## 2022-09-23 NOTE — VITALS
[Maximal Pain Intensity: 0/10] : 0/10 [Least Pain Intensity: 0/10] : 0/10 [90: Able to carry normal activity; minor signs or symptoms of disease.] : 90: Able to carry normal activity; minor signs or symptoms of disease.  [4 - Distress Level] : Distress Level: 4 [ECOG Performance Status: 0 - Fully active, able to carry on all pre-disease performance without restriction] : Performance Status: 0 - Fully active, able to carry on all pre-disease performance without restriction

## 2022-09-23 NOTE — DISEASE MANAGEMENT
[Pathological] : TNM Stage: p [IIIA] : IIIA [] : Patient had no Bone Scan performed [BiopsyDate] : 8/25/22 [MeasuredProstateVolume] : 79cc [FreeTextEntry7] : MR prostate 7/25/22 showed a 5.7 x 4.7 x 5.6 [transverse x AP x CC] cm = 79 cc gland. Dominant lesion in right, posterior medial (PZpm), tasmxnha-ei-dano, peripheral zone (1cm, [place]-5), suspicious for GAIL; no SVI/LAD. Urethral deviation present, defect noted at bladder neck. (underwent TURP in 2019). \par \par Prostate biopsy 8/25/22 showed G8(4+4) disease in right posterior medial mid to apex (2/7, 10%) and also 1 core 7(4+3) (10%); 1/5 sites involved.\par \par Bone scan 9/9/22 negative for metastasis.  [TTNM] : 3a [NTNM] : 0 [MTNM] : 0

## 2022-09-23 NOTE — DISEASE MANAGEMENT
[Pathological] : TNM Stage: p [IIIA] : IIIA [] : Patient had no Bone Scan performed [BiopsyDate] : 8/25/22 [MeasuredProstateVolume] : 79cc [FreeTextEntry7] : MR prostate 7/25/22 showed a 5.7 x 4.7 x 5.6 [transverse x AP x CC] cm = 79 cc gland. Dominant lesion in right, posterior medial (PZpm), zyniecba-vj-hbgn, peripheral zone (1cm, [place]-5), suspicious for GAIL; no SVI/LAD. Urethral deviation present, defect noted at bladder neck. (underwent TURP in 2019). \par \par Prostate biopsy 8/25/22 showed G8(4+4) disease in right posterior medial mid to apex (2/7, 10%) and also 1 core 7(4+3) (10%); 1/5 sites involved.\par \par Bone scan 9/9/22 negative for metastasis.  [TTNM] : 3a [NTNM] : 0 [MTNM] : 0

## 2022-09-23 NOTE — PHYSICAL EXAM
[Sclera] : the sclera and conjunctiva were normal [] : no respiratory distress [Exaggerated Use Of Accessory Muscles For Inspiration] : no accessory muscle use [Nondistended] : nondistended [Skin Color & Pigmentation] : normal skin color and pigmentation [Normal] : oriented to person, place and time, the affect was normal, the mood was normal and not anxious [Extraocular Movements] : extraocular movements were intact [Respiration, Rhythm And Depth] : normal respiratory rhythm and effort [Arterial Pulses Normal] : the arterial pulses were normal [Edema] : no peripheral edema present [Abdomen Soft] : soft [Nail Clubbing] : no clubbing  or cyanosis of the fingernails [Range of Motion to Joints] : range of motion to joints [No Focal Deficits] : no focal deficits

## 2022-09-28 ENCOUNTER — TRANSCRIPTION ENCOUNTER (OUTPATIENT)
Age: 67
End: 2022-09-28

## 2022-10-04 ENCOUNTER — TRANSCRIPTION ENCOUNTER (OUTPATIENT)
Age: 67
End: 2022-10-04

## 2022-10-19 NOTE — DISCHARGE NOTE ADULT - FUNCTIONAL STATUS DATE
15-Rodrigo-2019 17-Jan-2019 Graft Cartilage Fenestration Text: The cartilage was fenestrated with a 2mm punch biopsy to help facilitate graft survival and healing.

## 2022-11-22 ENCOUNTER — NON-APPOINTMENT (OUTPATIENT)
Age: 67
End: 2022-11-22

## 2022-12-01 LAB
PSA FREE SERPL-MCNC: 0.15 NG/ML
PSA SERPL-MCNC: 0.58 NG/ML

## 2022-12-18 ENCOUNTER — OUTPATIENT (OUTPATIENT)
Dept: OUTPATIENT SERVICES | Facility: HOSPITAL | Age: 67
LOS: 1 days | End: 2022-12-18
Payer: COMMERCIAL

## 2022-12-18 ENCOUNTER — RESULT REVIEW (OUTPATIENT)
Age: 67
End: 2022-12-18

## 2022-12-18 ENCOUNTER — APPOINTMENT (OUTPATIENT)
Dept: MRI IMAGING | Facility: IMAGING CENTER | Age: 67
End: 2022-12-18

## 2022-12-18 DIAGNOSIS — Z96.653 PRESENCE OF ARTIFICIAL KNEE JOINT, BILATERAL: Chronic | ICD-10-CM

## 2022-12-18 DIAGNOSIS — C61 MALIGNANT NEOPLASM OF PROSTATE: ICD-10-CM

## 2022-12-18 DIAGNOSIS — Z98.89 OTHER SPECIFIED POSTPROCEDURAL STATES: Chronic | ICD-10-CM

## 2022-12-18 PROCEDURE — 76498P: CUSTOM | Mod: 26

## 2022-12-18 PROCEDURE — 72197 MRI PELVIS W/O & W/DYE: CPT

## 2022-12-18 PROCEDURE — A9585: CPT

## 2022-12-18 PROCEDURE — 76498 UNLISTED MR PROCEDURE: CPT

## 2022-12-18 PROCEDURE — 72197 MRI PELVIS W/O & W/DYE: CPT | Mod: 26

## 2022-12-20 ENCOUNTER — APPOINTMENT (OUTPATIENT)
Dept: RADIATION ONCOLOGY | Facility: CLINIC | Age: 67
End: 2022-12-20

## 2022-12-20 VITALS
RESPIRATION RATE: 17 BRPM | BODY MASS INDEX: 27.54 KG/M2 | HEART RATE: 100 BPM | HEIGHT: 74 IN | WEIGHT: 214.61 LBS | SYSTOLIC BLOOD PRESSURE: 165 MMHG | OXYGEN SATURATION: 97 % | DIASTOLIC BLOOD PRESSURE: 79 MMHG | TEMPERATURE: 98.2 F

## 2022-12-20 PROCEDURE — 99213 OFFICE O/P EST LOW 20 MIN: CPT

## 2022-12-20 RX ORDER — BICALUTAMIDE 50 MG/1
50 TABLET ORAL DAILY
Qty: 30 | Refills: 2 | Status: DISCONTINUED | COMMUNITY
Start: 2022-09-22 | End: 2022-12-20

## 2022-12-20 NOTE — REVIEW OF SYSTEMS
[Negative] : Endocrine [Hematuria: Grade 0] : Hematuria: Grade 0 [Urinary Incontinence: Grade 0] : Urinary Incontinence: Grade 0  [Urinary Retention: Grade 0] : Urinary Retention: Grade 0 [Urinary Tract Pain: Grade 0] : Urinary Tract Pain: Grade 0 [Urinary Urgency: Grade 0] : Urinary Urgency: Grade 0 [Urinary Frequency: Grade 1 - Present] : Urinary Frequency: Grade 1 - Present [Erectile Dysfunction: Grade 2 - Decrease in erectile function (frequency/rigidity of erections), erectile intervention indicated, (e.g., medication or mechanical devices such as penile pump)] : Erectile Dysfunction: Grade 2 - Decrease in erectile function (frequency/rigidity of erections), erectile intervention indicated, (e.g., medication or mechanical devices such as penile pump) [Ejaculation Disorder: Grade 2 - Anejaculation or retrograde ejaculation] : Ejaculation Disorder: Grade 2 - Anejaculation or retrograde ejaculation

## 2022-12-21 NOTE — DISEASE MANAGEMENT
[1] : T1 [c] : c [0] : M0 [0-10] : 0 -10 ng/mL [Biopsy with Fusion] : Patient had a biopsy with fusion on [8] : Fusion Biopsy Alvino Score: 8 [5] : 5 [Extracapsular Extension] : Extracapsular extension [IIIA] : IIIA [Pathological] : TNM Stage: p [] : Patient had no Bone Scan performed [BiopsyDate] : 8/25/22 [MeasuredProstateVolume] : 79cc [FreeTextEntry7] : MR prostate 7/25/22 showed a 5.7 x 4.7 x 5.6 [transverse x AP x CC] cm = 79 cc gland. Dominant lesion in right, posterior medial (PZpm), khtnaiqx-ly-ebeg, peripheral zone (1cm, [place]-5), suspicious for GAIL; no SVI/LAD. Urethral deviation present, defect noted at bladder neck. (underwent TURP in 2019). \par \par Prostate biopsy 8/25/22 showed G8(4+4) disease in right posterior medial mid to apex (2/7, 10%) and also 1 core 7(4+3) (10%); 1/5 sites involved.\par \par Bone scan 9/9/22 negative for metastasis.  [TTNM] : 3a [NTNM] : 0 [MTNM] : 0

## 2022-12-21 NOTE — PHYSICAL EXAM
[Normal] : well developed, well nourished, in no acute distress [] : no respiratory distress [Exaggerated Use Of Accessory Muscles For Inspiration] : no accessory muscle use [Musculoskeletal - Swelling] : no joint swelling [Nail Clubbing] : no clubbing  or cyanosis of the fingernails [Sclera] : the sclera and conjunctiva were normal [Extraocular Movements] : extraocular movements were intact [Respiration, Rhythm And Depth] : normal respiratory rhythm and effort [Arterial Pulses Normal] : the arterial pulses were normal [Abdomen Soft] : soft [Nondistended] : nondistended [Skin Color & Pigmentation] : normal skin color and pigmentation [No Focal Deficits] : no focal deficits [Oriented To Time, Place, And Person] : oriented to person, place, and time [Affect] : the affect was normal [Not Anxious] : not anxious

## 2022-12-21 NOTE — HISTORY OF PRESENT ILLNESS
[FreeTextEntry1] : Cy Juarez is a 68yo man with history of TURP 8/2019 who presents to discuss RT management options for prostate cancer.\par \par PSA history:\par 11/30/22 0.58\par -> Eligard 3 month injection in 10/2022 \par 9/22/22: 1.19; testosterone 402 \par 6/29/21: 1.05 Free psa: 0.15\par 2/2019: 2.45 Free psa: 0.5\par 9/2018: 2.44\par 6/2017: 2.36- free psa 0.46\par \par 8/18/19: TURP: path-BPH\par \par 7/25/22: prostate MRI: Right, posterior medial (PZpm), faswhpqa-tg-cisn, peripheral zone lesion as detailed above. Suspicion for extracapsular extension.PIRADS 5. No seminal vesicle invasion or pelvic lymphadenopathy. Prostate Volume: 79 mL\par \par 8/25/22: 12 core biopsy:  1 right posterior mid to apex tissue did not survive processing, 4 standard cores and 8 targeted biopsies of right posterior medial midgland to apex PZ lesion\par 3/11 cores positive (1 site), all targeted to lesion, 2 nakita 8, 1 nakita 4+3, all involving 10% of core\par \par 9/9/22: negative bone scan\par \par 9/14: Met with Dr. Chen, urologist, who advised against surgery due to patient age and suggested RT. \par \par Occupation: Works for [employer] in [employer] dept\par \par When initially seen 09/22/22: Feels generally well and offers no acute complaints. Notes intentional 20 lbs weight loss past 5 months  due diet/exercise. Taking finasteride. IPSS/EPIC Score 9/14. \par - Recommended eoadjuvant ADT for 3 months with intent of cytoreduction of disease (prostate 80 ccs in size), in particularly for retraction of the posterior GAIL, which is a contraindication for spaceOAR. He proceeded with ADT;  He had Eligard 3 month injection in 10/2022 and due for second Eligard injection in January 2023.\par \par MRI of the prostate 12/18/22 showed some size retraction, 79 cc -> 71 cc, with retraction of the right posterior GAIL. \par \par Returns for re-evaluation today 12/21/22. \par Tolerating ADT without issue. No new urinary/bowel complaints. Wishes to proceed with RT.

## 2022-12-21 NOTE — END OF VISIT
[FreeTextEntry3] : I saw and examined this patient on the date of service with my assigned resident physician, Dr. Malina Yan. I was involved in all procedures and laboratory/radiographic assessments. I personally confirmed pertinent history and exam findings and reviewed the patient's diagnosis and plan with them. I have reviewed and edited the resident note and agree with their overall plan. Additional comments below.\par \par 67M w/ high risk prostate cancer, rcT3a G8(4+4) iPSA 1.19 7/12/22. 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 He has undergone cytoreduction with retraction of GAIL; his prostate remains too large for brachytherapy approaches. We recommend treatment at this time with a hypofractionated approach including coverage of his pelvic nodes. We reviewed again risks, benefits, and alternatives to treatment. He wishes to proceed as recommended and informed consent was obtained; we will schedule him for spacer/fiducial placement and simulation. He will continue ADT for at least 1 year, preferably longer if tolerated.

## 2023-01-11 ENCOUNTER — NON-APPOINTMENT (OUTPATIENT)
Age: 68
End: 2023-01-11

## 2023-01-12 ENCOUNTER — NON-APPOINTMENT (OUTPATIENT)
Age: 68
End: 2023-01-12

## 2023-01-12 PROCEDURE — 76872 US TRANSRECTAL: CPT | Mod: 26

## 2023-01-12 PROCEDURE — 55876 PLACE RT DEVICE/MARKER PROS: CPT

## 2023-01-12 PROCEDURE — 55874 TPRNL PLMT BIODEGRDABL MATRL: CPT

## 2023-01-12 RX ORDER — DIAZEPAM 5 MG/1
5 TABLET ORAL
Qty: 0 | Refills: 0 | Status: COMPLETED | OUTPATIENT
Start: 2022-12-27

## 2023-01-12 RX ORDER — ONDANSETRON 4 MG/1
4 TABLET, ORALLY DISINTEGRATING ORAL
Qty: 0 | Refills: 0 | Status: COMPLETED | OUTPATIENT
Start: 2022-12-27

## 2023-01-13 ENCOUNTER — NON-APPOINTMENT (OUTPATIENT)
Age: 68
End: 2023-01-13

## 2023-01-13 NOTE — PROCEDURE
[FreeTextEntry1] : Procedure Performed: TRANSPERINEAL PLACEMENT OF SPACEOAR GEL AND FIDUCIAL MARKERS [FreeTextEntry2] : Indications Adenocarcinoma of prostate (185) (C61). Preparation for radiation therapy  [FreeTextEntry3] : **Patient with Shellfish Allergy, Iodine free space oar kit used.\par \par Preoperative Diagnosis: Prostate cancer\par Postoperative Diagnosis: Prostate cancer\par Anesthesia: Local\par \par In preparation for the procedure, he self-administered an enema one hour before leaving home. He was prescribed a 3 days course of oral antibiotics twice daily to be started a day prior to the procedure. Topical EMLA cream was applied to the perineal area prior to procedure. Patient was administered Valium 5mg, Tylenol 650 mg, and Zofran 4mg upon arrival in the department prior to procedure.\par \par Procedure risk and benefits were reviewed with patient and a written consent was obtained prior to procedure. A time out was observed with patient name, date of birth, procedure, position, and site verified.\par \par Patient was placed in a dorsal lithotomy position. Chloraprep was used to prep the skin. Less than 10 cc of Lidocaine 2% and sodium bicarbonate 8.4% was injected subcutaneously.\par \par While maintaining aseptic technique, an ultrasound probe was inserted into the rectum to visualize the prostate. Afterwards, 16 cc of Lidocaine and sodium bicarbonate was injected internally at the prostate apex and bilateral neurovascular bundles for the nerve block. Next, the hydrogel spacer kit was opened onto the sterile field and the hydrogel injection apparatus was prepared. Four gold fiducial markers were prepared on the sterile field. One fiducial marker was placed into each of the following sites: right base, right apex, left mid, and left apex, via transperineal needles under ultrasound guidance. A transperineal needle was positioned into the mid-line perirectal fat between the anterior rectal wall and prostate under ultrasound guidance. Less than 10 cc of saline was injected via the needle to hydrodissect the space and confirm proper placement in both axial and sagittal views. The syringe was aspirated to confirm the needle was extravascular. The syringe was replaced with the hydrogel injection apparatus and the gel was injected. The needle was then removed. There was minimal blood loss. The patient tolerated procedure well.\par \par Specimens: None\par Complications: None\par EBL: 3 cc\par \par Patient was transferred to the recovery area on a monitor. Vital signs were stable. He tolerated fluid and a snack by mouth and was made comfortable. He denied pain. Post procedure instruction were given and reviewed with patient. CT/SIM appointment was confirmed. He was discharged home in a stable condition, accompanied by his wife.

## 2023-01-19 ENCOUNTER — NON-APPOINTMENT (OUTPATIENT)
Age: 68
End: 2023-01-19

## 2023-01-26 ENCOUNTER — NON-APPOINTMENT (OUTPATIENT)
Age: 68
End: 2023-01-26

## 2023-01-27 PROCEDURE — 77301 RADIOTHERAPY DOSE PLAN IMRT: CPT | Mod: 26

## 2023-01-27 PROCEDURE — 77300 RADIATION THERAPY DOSE PLAN: CPT | Mod: 26

## 2023-01-27 PROCEDURE — 77338 DESIGN MLC DEVICE FOR IMRT: CPT | Mod: 26

## 2023-02-06 PROCEDURE — 77014: CPT | Mod: 26

## 2023-02-07 PROCEDURE — 77014: CPT | Mod: 26

## 2023-02-08 ENCOUNTER — NON-APPOINTMENT (OUTPATIENT)
Age: 68
End: 2023-02-08

## 2023-02-08 DIAGNOSIS — R39.15 URGENCY OF URINATION: ICD-10-CM

## 2023-02-08 PROCEDURE — 77387B: CUSTOM | Mod: 26

## 2023-02-08 NOTE — REVIEW OF SYSTEMS
[Hematuria: Grade 0] : Hematuria: Grade 0 [Urinary Incontinence: Grade 0] : Urinary Incontinence: Grade 0  [Urinary Tract Pain: Grade 0] : Urinary Tract Pain: Grade 0 [Urinary Urgency: Grade 1 - Present] : Urinary Urgency: Grade 1 - Present [Urinary Frequency: Grade 1 - Present] : Urinary Frequency: Grade 1 - Present [Erectile Dysfunction: Grade 2 - Decrease in erectile function (frequency/rigidity of erections), erectile intervention indicated, (e.g., medication or mechanical devices such as penile pump)] : Erectile Dysfunction: Grade 2 - Decrease in erectile function (frequency/rigidity of erections), erectile intervention indicated, (e.g., medication or mechanical devices such as penile pump) [Ejaculation Disorder: Grade 2 - Anejaculation or retrograde ejaculation] : Ejaculation Disorder: Grade 2 - Anejaculation or retrograde ejaculation [Constipation: Grade 0] : Constipation: Grade 0 [Diarrhea: Grade 0] : Diarrhea: Grade 0 [Fatigue: Grade 0] : Fatigue: Grade 0 [Urinary Retention: Grade 1 - Urinary, suprapubic or intermittent catheter placement not indicated; able to void with some residual] : Urinary Retention: Grade 1 - Urinary, suprapubic or intermittent catheter placement not indicated; able to void with some residual

## 2023-02-08 NOTE — HISTORY OF PRESENT ILLNESS
[FreeTextEntry1] : 67M w/ high risk prostate cancer, rcT3a G8(4+4) iPSA 1.19 7/12/22.  \par \par MR prostate 7/25/22 showed a 5.7 x 4.7 x 5.6 [transverse x AP x CC] cm = 79 cc gland. Dominant lesion in right, posterior medial (PZpm), sxdrneos-hw-zkjh, peripheral zone (1cm, [place]-5), suspicious for GAIL; no SVI/LAD. Urethral deviation present, defect noted at bladder neck. (underwent TURP in 2019).  Prostate biopsy 8/25/22 showed G8(4+4) disease in right posterior medial mid to apex (2/7, 10%) and also 1 core 7(4+3) (10%); 1/5 sites involved. Bone scan 9/9/22 negative for metastasis.\par \par -> Eligard 3 month injection in 10/2022 \par MRI of the prostate 12/18/22 showed some size retraction, 79 cc -> 71 cc, with retraction of the right posterior GAIL. \par Planned for definitive radiation therapy to prostate, seminal vesicles, and pelvic nodes with 2 years ADT if possible. \par \par 2/8/23: fx 3/26\par Patient noted 3 hour stint of almost complete urinary obstruction when was relieved by walking around. \par Taking Flomax x2, not using AZO yet. resolved in AM. Doing okay now but noting more urgency. No bowel issues.

## 2023-02-08 NOTE — PHYSICAL EXAM
[Normal] : well developed, well nourished, in no acute distress [Sclera] : the sclera and conjunctiva were normal [Extraocular Movements] : extraocular movements were intact [] : no respiratory distress [Respiration, Rhythm And Depth] : normal respiratory rhythm and effort [Exaggerated Use Of Accessory Muscles For Inspiration] : no accessory muscle use [Arterial Pulses Normal] : the arterial pulses were normal [Abdomen Soft] : soft [Nondistended] : nondistended [Musculoskeletal - Swelling] : no joint swelling [Nail Clubbing] : no clubbing  or cyanosis of the fingernails [Skin Color & Pigmentation] : normal skin color and pigmentation [No Focal Deficits] : no focal deficits [Oriented To Time, Place, And Person] : oriented to person, place, and time [Affect] : the affect was normal [Not Anxious] : not anxious

## 2023-02-08 NOTE — DISEASE MANAGEMENT
[Pathological] : TNM Stage: p [IIIA] : IIIA [1] : T1 [c] : c [0] : M0 [0-10] : 0 -10 ng/mL [Biopsy with Fusion] : Patient had a biopsy with fusion on [8] : Fusion Biopsy Alvino Score: 8 [5] : 5 [Extracapsular Extension] : Extracapsular extension [TTNM] : 3a [NTNM] : 0 [MTNM] : 0 [de-identified] : 741 [de-identified] : 4809 [de-identified] : pelvis and prostate  [] : Patient had no Bone Scan performed [BiopsyDate] : 8/25/22 [MeasuredProstateVolume] : 79cc [FreeTextEntry7] : MR prostate 7/25/22 showed a 5.7 x 4.7 x 5.6 [transverse x AP x CC] cm = 79 cc gland. Dominant lesion in right, posterior medial (PZpm), atpwvdmj-og-hsga, peripheral zone (1cm, [place]-5), suspicious for GAIL; no SVI/LAD. Urethral deviation present, defect noted at bladder neck. (underwent TURP in 2019). \par \par Prostate biopsy 8/25/22 showed G8(4+4) disease in right posterior medial mid to apex (2/7, 10%) and also 1 core 7(4+3) (10%); 1/5 sites involved.\par \par Bone scan 9/9/22 negative for metastasis.

## 2023-02-09 ENCOUNTER — TRANSCRIPTION ENCOUNTER (OUTPATIENT)
Age: 68
End: 2023-02-09

## 2023-02-09 LAB
APPEARANCE: CLEAR
BILIRUBIN URINE: NEGATIVE
BLOOD URINE: NEGATIVE
COLOR: NORMAL
GLUCOSE QUALITATIVE U: NEGATIVE
KETONES URINE: NORMAL
LEUKOCYTE ESTERASE URINE: NEGATIVE
NITRITE URINE: NEGATIVE
PH URINE: 5
PROTEIN URINE: NEGATIVE
SPECIFIC GRAVITY URINE: 1.01
UROBILINOGEN URINE: NORMAL

## 2023-02-09 PROCEDURE — 77387B: CUSTOM | Mod: 26

## 2023-02-10 PROCEDURE — 77427 RADIATION TX MANAGEMENT X5: CPT

## 2023-02-10 PROCEDURE — 77387B: CUSTOM | Mod: 26

## 2023-02-13 ENCOUNTER — TRANSCRIPTION ENCOUNTER (OUTPATIENT)
Age: 68
End: 2023-02-13

## 2023-02-13 PROCEDURE — 77387B: CUSTOM | Mod: 26

## 2023-02-14 PROCEDURE — 77387B: CUSTOM | Mod: 26

## 2023-02-15 ENCOUNTER — TRANSCRIPTION ENCOUNTER (OUTPATIENT)
Age: 68
End: 2023-02-15

## 2023-02-15 PROCEDURE — 77014: CPT | Mod: 26

## 2023-02-16 ENCOUNTER — NON-APPOINTMENT (OUTPATIENT)
Age: 68
End: 2023-02-16

## 2023-02-16 PROCEDURE — 77387B: CUSTOM | Mod: 26

## 2023-02-16 NOTE — HISTORY OF PRESENT ILLNESS
[FreeTextEntry1] : 67M w/ high risk prostate cancer, rcT3a G8(4+4) iPSA 1.19 7/12/22.  \par \par MR prostate 7/25/22 showed a 5.7 x 4.7 x 5.6 [transverse x AP x CC] cm = 79 cc gland. Dominant lesion in right, posterior medial (PZpm), bfgumkfe-ck-rrit, peripheral zone (1cm, [place]-5), suspicious for GAIL; no SVI/LAD. Urethral deviation present, defect noted at bladder neck. (underwent TURP in 2019).  Prostate biopsy 8/25/22 showed G8(4+4) disease in right posterior medial mid to apex (2/7, 10%) and also 1 core 7(4+3) (10%); 1/5 sites involved. Bone scan 9/9/22 negative for metastasis.\par \par -> Eligard 3 month injection in 10/2022 \par MRI of the prostate 12/18/22 showed some size retraction, 79 cc -> 71 cc, with retraction of the right posterior GAIL. \par Planned for definitive radiation therapy to prostate, seminal vesicles, and pelvic nodes with 2 years ADT if possible. \par \par 2/8/23: fx 3/26\par Patient noted 3 hour stint of almost complete urinary obstruction when was relieved by walking around. \par Taking Flomax x2, not using AZO yet. resolved in AM. Doing okay now but noting more urgency. No bowel issues.\par \par 2/16/2023 fx 9/26:  Baseline frequency, slight dysuria. Taking Flomax x2 at night, not using AZO yet. Bowels ok. No hot flashes

## 2023-02-16 NOTE — DISEASE MANAGEMENT
[Pathological] : TNM Stage: p [IIIA] : IIIA [1] : T1 [c] : c [0] : M0 [0-10] : 0 -10 ng/mL [Biopsy with Fusion] : Patient had a biopsy with fusion on [8] : Fusion Biopsy Alvino Score: 8 [5] : 5 [Extracapsular Extension] : Extracapsular extension [TTNM] : 3a [NTNM] : 0 [MTNM] : 0 [[place] [id number]] : 3091 [de-identified] : 7032 [de-identified] : pelvis and prostate  [] : Patient had no Bone Scan performed [BiopsyDate] : 8/25/22 [MeasuredProstateVolume] : 79cc [FreeTextEntry7] : MR prostate 7/25/22 showed a 5.7 x 4.7 x 5.6 [transverse x AP x CC] cm = 79 cc gland. Dominant lesion in right, posterior medial (PZpm), uhhyzemr-ty-szag, peripheral zone (1cm, MT-5), suspicious for GAIL; no SVI/LAD. Urethral deviation present, defect noted at bladder neck. (underwent TURP in 2019). \par \par Prostate biopsy 8/25/22 showed G8(4+4) disease in right posterior medial mid to apex (2/7, 10%) and also 1 core 7(4+3) (10%); 1/5 sites involved.\par \par Bone scan 9/9/22 negative for metastasis.

## 2023-02-16 NOTE — VITALS
[Maximal Pain Intensity: 0/10] : 0/10 [90: Able to carry normal activity; minor signs or symptoms of disease.] : 90: Able to carry normal activity; minor signs or symptoms of disease.  [ECOG Performance Status: 0 - Fully active, able to carry on all pre-disease performance without restriction] : Performance Status: 0 - Fully active, able to carry on all pre-disease performance without restriction [Least Pain Intensity: 0/10] : 0/10

## 2023-02-16 NOTE — PHYSICAL EXAM
[Normal] : well developed, well nourished, in no acute distress [Sclera] : the sclera and conjunctiva were normal [Extraocular Movements] : extraocular movements were intact [Respiration, Rhythm And Depth] : normal respiratory rhythm and effort [Exaggerated Use Of Accessory Muscles For Inspiration] : no accessory muscle use [Arterial Pulses Normal] : the arterial pulses were normal [Abdomen Soft] : soft [Nondistended] : nondistended [Nail Clubbing] : no clubbing  or cyanosis of the fingernails [Skin Color & Pigmentation] : normal skin color and pigmentation [No Focal Deficits] : no focal deficits [Oriented To Time, Place, And Person] : oriented to person, place, and time [Affect] : the affect was normal [Outer Ear] : the ears and nose were normal in appearance [Hearing Threshold Finger Rub Not [place]] : hearing was normal [Range of Motion to Joints] : range of motion to joints [] : no rash [Motor Exam] : the motor exam was normal

## 2023-02-16 NOTE — REVIEW OF SYSTEMS
[Constipation: Grade 0] : Constipation: Grade 0 [Diarrhea: Grade 0] : Diarrhea: Grade 0 [Fatigue: Grade 0] : Fatigue: Grade 0 [Hematuria: Grade 0] : Hematuria: Grade 0 [Urinary Incontinence: Grade 0] : Urinary Incontinence: Grade 0  [Urinary Retention: Grade 1 - Urinary, suprapubic or intermittent catheter placement not indicated; able to void with some residual] : Urinary Retention: Grade 1 - Urinary, suprapubic or intermittent catheter placement not indicated; able to void with some residual [Urinary Tract Pain: Grade 0] : Urinary Tract Pain: Grade 0 [Dyspepsia: Grade 1 - Mild symptoms; intervention not indicated] : Dyspepsia: Grade 1 - Mild symptoms; intervention not indicated [Urinary Urgency: Grade 0] : Urinary Urgency: Grade 0 [Urinary Frequency: Grade 0] : Urinary Frequency: Grade 0

## 2023-02-17 PROCEDURE — 77387B: CUSTOM | Mod: 26

## 2023-02-17 PROCEDURE — 77427 RADIATION TX MANAGEMENT X5: CPT

## 2023-02-21 PROCEDURE — 77387B: CUSTOM | Mod: 26

## 2023-02-22 PROCEDURE — 77014: CPT | Mod: 26

## 2023-02-23 ENCOUNTER — NON-APPOINTMENT (OUTPATIENT)
Age: 68
End: 2023-02-23

## 2023-02-23 PROCEDURE — 77387B: CUSTOM | Mod: 26

## 2023-02-23 NOTE — DISEASE MANAGEMENT
[Pathological] : TNM Stage: p [IIIA] : IIIA [1] : T1 [c] : c [0] : M0 [0-10] : 0 -10 ng/mL [Biopsy with Fusion] : Patient had a biopsy with fusion on [8] : Fusion Biopsy Alvino Score: 8 [5] : 5 [Extracapsular Extension] : Extracapsular extension [TTNM] : 3a [NTNM] : 0 [MTNM] : 0 [de-identified] : 3510 cGy [de-identified] : 5432 [de-identified] : pelvis and prostate  [] : Patient had no Bone Scan performed [BiopsyDate] : 8/25/22 [MeasuredProstateVolume] : 79cc [FreeTextEntry7] : MR prostate 7/25/22 showed a 5.7 x 4.7 x 5.6 [transverse x AP x CC] cm = 79 cc gland. Dominant lesion in right, posterior medial (PZpm), abdlbskv-hr-acvx, peripheral zone (1cm, [place]-5), suspicious for GAIL; no SVI/LAD. Urethral deviation present, defect noted at bladder neck. (underwent TURP in 2019). \par \par Prostate biopsy 8/25/22 showed G8(4+4) disease in right posterior medial mid to apex (2/7, 10%) and also 1 core 7(4+3) (10%); 1/5 sites involved.\par \par Bone scan 9/9/22 negative for metastasis.  [FreeTextEntry6] : Anterior Rhinoscopy insufficient to account for symptoms.\par \par After informed verbal consent is obtained, the fiberoptic nasal endoscope #[id number] is passed via the right nasal cavity.\par The following anatomic sites were directly examined in a sequential fashion:\par The scope was introduced in both  nasal passages between the middle and inferior turbinates to exam the inferior portion of the middle meatus and the fontanelle, as well as the maxillary ostia.  Next, the scope was passed medially and posteriorly to the middle turbinates to examine the sphenoethmoid recess and the superior turbinate region.\par  \par \par   There is [ 0 ]% obstruction of the nasopharynx with adenoid tissue.\par \par A deviated nasal septum was noted causing obstruction.\par The turbinates were congested-bilateral.\par \par Right Side:\par ·               Mucosa: wnl\par ·               Mucous: none\par ·               Polyp: none\par ·               Inferior Turbinate: sl congested\par ·               Middle Turbinate:sl congested\par ·               Superior Turbinate: wnl\par ·               Inferior Meatus:clear\par ·               Middle Meatus: clear\par ·               Super Meatus: clear\par ·               Sphenoethmoidal Recess: wnl\par \par \par \par Left Side:\par ·               Mucosa: wnl\par ·               Mucous:none\par ·               Polyp: none\par ·               Inferior Turbinate: sl congested\par ·               Middle Turbinate: sl congested\par ·               Superior Turbinate:wnl\par ·               Inferior Meatus: clear\par ·               Middle Meatus: clear\par ·               Super Meatus:clear\par ·               Sphenoethmoidal Recess: wnl\par \par

## 2023-02-23 NOTE — PHYSICAL EXAM
[Normal] : well developed, well nourished, in no acute distress [Sclera] : the sclera and conjunctiva were normal [Extraocular Movements] : extraocular movements were intact [Outer Ear] : the ears and nose were normal in appearance [Hearing Threshold Finger Rub Not [place]] : hearing was normal [Respiration, Rhythm And Depth] : normal respiratory rhythm and effort [Exaggerated Use Of Accessory Muscles For Inspiration] : no accessory muscle use [Arterial Pulses Normal] : the arterial pulses were normal [Abdomen Soft] : soft [Nondistended] : nondistended [Nail Clubbing] : no clubbing  or cyanosis of the fingernails [Range of Motion to Joints] : range of motion to joints [Skin Color & Pigmentation] : normal skin color and pigmentation [] : no rash [No Focal Deficits] : no focal deficits [Motor Exam] : the motor exam was normal [Oriented To Time, Place, And Person] : oriented to person, place, and time [Affect] : the affect was normal

## 2023-02-23 NOTE — HISTORY OF PRESENT ILLNESS
[FreeTextEntry1] : 67M w/ high risk prostate cancer, rcT3a G8(4+4) iPSA 1.19 7/12/22.  \par \par MR prostate 7/25/22 showed a 5.7 x 4.7 x 5.6 [transverse x AP x CC] cm = 79 cc gland. Dominant lesion in right, posterior medial (PZpm), jmdmznsr-my-juvx, peripheral zone (1cm, [place]-5), suspicious for GAIL; no SVI/LAD. Urethral deviation present, defect noted at bladder neck. (underwent TURP in 2019).  Prostate biopsy 8/25/22 showed G8(4+4) disease in right posterior medial mid to apex (2/7, 10%) and also 1 core 7(4+3) (10%); 1/5 sites involved. Bone scan 9/9/22 negative for metastasis.\par \par -> Eligard 3 month injection in 10/2022 \par MRI of the prostate 12/18/22 showed some size retraction, 79 cc -> 71 cc, with retraction of the right posterior GAIL. \par Planned for definitive radiation therapy to prostate, seminal vesicles, and pelvic nodes with 2 years ADT if possible. \par \par 2/8/23: fx 3/26\par Patient noted 3 hour stint of almost complete urinary obstruction when was relieved by walking around. \par Taking Flomax x2, not using AZO yet. resolved in AM. Doing okay now but noting more urgency. No bowel issues.\par \par 2/16/2023 fx 9/26:  Baseline frequency, slight dysuria. Taking Flomax x 2 at night, not using AZO yet. Bowels ok. No hot flashes\par \par 2/23/23: 13/26 fx: More fatigue. Hesitancy/frequency and small amounts of urine last night for 2-3 hours. Flow improved after taking 1 tab AZO; continues to take aleve. Continues to take tamsulosin 2 tabs at night. BMs softer 1-2x.

## 2023-02-23 NOTE — REVIEW OF SYSTEMS
[Constipation: Grade 0] : Constipation: Grade 0 [Diarrhea: Grade 0] : Diarrhea: Grade 0 [Hematuria: Grade 0] : Hematuria: Grade 0 [Urinary Incontinence: Grade 0] : Urinary Incontinence: Grade 0  [Urinary Retention: Grade 1 - Urinary, suprapubic or intermittent catheter placement not indicated; able to void with some residual] : Urinary Retention: Grade 1 - Urinary, suprapubic or intermittent catheter placement not indicated; able to void with some residual [Urinary Tract Pain: Grade 0] : Urinary Tract Pain: Grade 0 [Rectal Pain: Grade 0] : Rectal Pain: Grade 0 [Fatigue: Grade 1 - Fatigue relieved by rest] : Fatigue: Grade 1 - Fatigue relieved by rest [Urinary Urgency: Grade 0] : Urinary Urgency: Grade 0 [Urinary Frequency: Grade 0] : Urinary Frequency: Grade 0

## 2023-02-24 PROCEDURE — 77387B: CUSTOM | Mod: 26

## 2023-02-27 PROCEDURE — 77387B: CUSTOM | Mod: 26

## 2023-02-27 PROCEDURE — 77427 RADIATION TX MANAGEMENT X5: CPT

## 2023-02-28 PROCEDURE — 77387B: CUSTOM | Mod: 26

## 2023-03-01 PROCEDURE — 77014: CPT | Mod: 26

## 2023-03-02 ENCOUNTER — NON-APPOINTMENT (OUTPATIENT)
Age: 68
End: 2023-03-02

## 2023-03-02 DIAGNOSIS — R11.0 NAUSEA: ICD-10-CM

## 2023-03-02 PROCEDURE — 77387B: CUSTOM | Mod: 26

## 2023-03-02 NOTE — DISEASE MANAGEMENT
[Pathological] : TNM Stage: p [IIIA] : IIIA [1] : T1 [c] : c [0] : M0 [0-10] : 0 -10 ng/mL [Biopsy with Fusion] : Patient had a biopsy with fusion on [8] : Fusion Biopsy Alvino Score: 8 [5] : 5 [Extracapsular Extension] : Extracapsular extension [TTNM] : 3a [NTNM] : 0 [MTNM] : 0 [de-identified] : 5820 cGy [de-identified] : [id number] [de-identified] : pelvis and prostate  [] : Patient had no Bone Scan performed [BiopsyDate] : 8/25/22 [MeasuredProstateVolume] : 79cc [FreeTextEntry7] : MR prostate 7/25/22 showed a 5.7 x 4.7 x 5.6 [transverse x AP x CC] cm = 79 cc gland. Dominant lesion in right, posterior medial (PZpm), zjorbums-na-saiw, peripheral zone (1cm, NE-5), suspicious for GAIL; no SVI/LAD. Urethral deviation present, defect noted at bladder neck. (underwent TURP in 2019). \par \par Prostate biopsy 8/25/22 showed G8(4+4) disease in right posterior medial mid to apex (2/7, 10%) and also 1 core 7(4+3) (10%); 1/5 sites involved.\par \par Bone scan 9/9/22 negative for metastasis.

## 2023-03-02 NOTE — REVIEW OF SYSTEMS
[Constipation: Grade 0] : Constipation: Grade 0 [Rectal Pain: Grade 0] : Rectal Pain: Grade 0 [Hematuria: Grade 0] : Hematuria: Grade 0 [Urinary Incontinence: Grade 0] : Urinary Incontinence: Grade 0  [Urinary Tract Pain: Grade 0] : Urinary Tract Pain: Grade 0 [Diarrhea: Grade 1 - Increase of <4 stools per day over baseline; mild increase in ostomy output compared to baseline] : Diarrhea: Grade 1 - Increase of <4 stools per day over baseline; mild increase in ostomy output compared to baseline [Urinary Retention: Grade 0] : Urinary Retention: Grade 0 [Urinary Urgency: Grade 1 - Present] : Urinary Urgency: Grade 1 - Present [Urinary Frequency: Grade 1 - Present] : Urinary Frequency: Grade 1 - Present [Fatigue: Grade 2 - Fatigue not relieved by rest; limiting instrumental ADL] : Fatigue: Grade 2 - Fatigue not relieved by rest; limiting instrumental ADL

## 2023-03-02 NOTE — HISTORY OF PRESENT ILLNESS
[FreeTextEntry1] : 67M w/ high risk prostate cancer, rcT3a G8(4+4) iPSA 1.19 7/12/22.  \par \par MR prostate 7/25/22 showed a 5.7 x 4.7 x 5.6 [transverse x AP x CC] cm = 79 cc gland. Dominant lesion in right, posterior medial (PZpm), jqpiwndq-sm-jjte, peripheral zone (1cm, GA-5), suspicious for GAIL; no SVI/LAD. Urethral deviation present, defect noted at bladder neck. (underwent TURP in 2019).  Prostate biopsy 8/25/22 showed G8(4+4) disease in right posterior medial mid to apex (2/7, 10%) and also 1 core 7(4+3) (10%); 1/5 sites involved. Bone scan 9/9/22 negative for metastasis.\par \par -> Eligard 3 month injection in 10/2022 \par MRI of the prostate 12/18/22 showed some size retraction, 79 cc -> 71 cc, with retraction of the right posterior GAIL. \par Planned for definitive radiation therapy to prostate, seminal vesicles, and pelvic nodes with 2 years ADT if possible. \par \par 2/8/23: fx 3/26\par Patient noted 3 hour stint of almost complete urinary obstruction when was relieved by walking around. \par Taking Flomax x2, not using AZO yet. resolved in AM. Doing okay now but noting more urgency. No bowel issues.\par \par 2/16/2023 fx 9/26:  Baseline frequency, slight dysuria. Taking Flomax x 2 at night, not using AZO yet. Bowels ok. No hot flashes\par \par 2/23/23: 13/26 fx: More fatigue. Hesitancy/frequency and small amounts of urine last night for 2-3 hours. Flow improved after taking 1 tab AZO; continues to take aleve. Continues to take tamsulosin 2 tabs at night. BMs softer 1-2x.\par \par 3/2/23: 18/26 fx: More fatigue, nausea, nocturia 2-3 x.  Flow better with tamsulosin 2 tabs at night,  AZO TID and aleve BID. Diarrhea x 2 today resolved with Imodium. Flow better with AZO TID and aleve BID. Denies dysuria or hot flashes.

## 2023-03-02 NOTE — PHYSICAL EXAM
[Normal] : well developed, well nourished, in no acute distress [Sclera] : the sclera and conjunctiva were normal [Extraocular Movements] : extraocular movements were intact [Outer Ear] : the ears and nose were normal in appearance [Hearing Threshold Finger Rub Not [place]] : hearing was normal [Respiration, Rhythm And Depth] : normal respiratory rhythm and effort [Exaggerated Use Of Accessory Muscles For Inspiration] : no accessory muscle use [Arterial Pulses Normal] : the arterial pulses were normal [Abdomen Soft] : soft [Nondistended] : nondistended [Nail Clubbing] : no clubbing  or cyanosis of the fingernails [Range of Motion to Joints] : range of motion to joints [Skin Color & Pigmentation] : normal skin color and pigmentation [] : no rash [No Focal Deficits] : no focal deficits [Motor Exam] : the motor exam was normal [Oriented To Time, Place, And Person] : oriented to person, place, and time [Affect] : the affect was normal

## 2023-03-03 PROCEDURE — 77387B: CUSTOM | Mod: 26

## 2023-03-06 PROCEDURE — 77427 RADIATION TX MANAGEMENT X5: CPT

## 2023-03-06 PROCEDURE — 77387B: CUSTOM | Mod: 26

## 2023-03-07 PROCEDURE — 77387B: CUSTOM | Mod: 26

## 2023-03-08 PROCEDURE — 77014: CPT | Mod: 26

## 2023-03-08 NOTE — REVIEW OF SYSTEMS
[Constipation: Grade 0] : Constipation: Grade 0 [Diarrhea: Grade 0] : Diarrhea: Grade 0 [Rectal Pain: Grade 0] : Rectal Pain: Grade 0 [Fatigue: Grade 1 - Fatigue relieved by rest] : Fatigue: Grade 1 - Fatigue relieved by rest [Hematuria: Grade 0] : Hematuria: Grade 0 [Urinary Incontinence: Grade 0] : Urinary Incontinence: Grade 0  [Urinary Retention: Grade 0] : Urinary Retention: Grade 0 [Urinary Tract Pain: Grade 0] : Urinary Tract Pain: Grade 0 [Urinary Urgency: Grade 1 - Present] : Urinary Urgency: Grade 1 - Present [Urinary Frequency: Grade 1 - Present] : Urinary Frequency: Grade 1 - Present

## 2023-03-08 NOTE — PHYSICAL EXAM
[Normal] : well developed, well nourished, in no acute distress [Sclera] : the sclera and conjunctiva were normal [Extraocular Movements] : extraocular movements were intact [Outer Ear] : the ears and nose were normal in appearance [Hearing Threshold Finger Rub Not [place]] : hearing was normal [Respiration, Rhythm And Depth] : normal respiratory rhythm and effort [Exaggerated Use Of Accessory Muscles For Inspiration] : no accessory muscle use [Arterial Pulses Normal] : the arterial pulses were normal [Abdomen Soft] : soft [Nondistended] : nondistended [Nail Clubbing] : no clubbing  or cyanosis of the fingernails [Range of Motion to Joints] : range of motion to joints [Skin Color & Pigmentation] : normal skin color and pigmentation [] : no rash [No Focal Deficits] : no focal deficits [Motor Exam] : the motor exam was normal [Oriented To Time, Place, And Person] : oriented to person, place, and time [Affect] : the affect was normal

## 2023-03-08 NOTE — HISTORY OF PRESENT ILLNESS
[FreeTextEntry1] : 67M w/ high risk prostate cancer, rcT3a G8(4+4) iPSA 1.19 7/12/22.  \par \par MR prostate 7/25/22 showed a 5.7 x 4.7 x 5.6 [transverse x AP x CC] cm = 79 cc gland. Dominant lesion in right, posterior medial (PZpm), zkfocxpq-xq-pzur, peripheral zone (1cm, MT-5), suspicious for GAIL; no SVI/LAD. Urethral deviation present, defect noted at bladder neck. (underwent TURP in 2019).  Prostate biopsy 8/25/22 showed G8(4+4) disease in right posterior medial mid to apex (2/7, 10%) and also 1 core 7(4+3) (10%); 1/5 sites involved. Bone scan 9/9/22 negative for metastasis.\par \par -> Eligard 3 month injection in 10/2022 \par MRI of the prostate 12/18/22 showed some size retraction, 79 cc -> 71 cc, with retraction of the right posterior GAIL. \par Planned for definitive radiation therapy to prostate, seminal vesicles, and pelvic nodes with 2 years ADT if possible. \par \par 2/8/23: fx 3/26\par Patient noted 3 hour stint of almost complete urinary obstruction when was relieved by walking around. \par Taking Flomax x2, not using AZO yet. resolved in AM. Doing okay now but noting more urgency. No bowel issues.\par \par 2/16/2023 fx 9/26:  Baseline frequency, slight dysuria. Taking Flomax x 2 at night, not using AZO yet. Bowels ok. No hot flashes\par \par 2/23/23: 13/26 fx: More fatigue. Hesitancy/frequency and small amounts of urine last night for 2-3 hours. Flow improved after taking 1 tab AZO; continues to take aleve. Continues to take tamsulosin 2 tabs at night. BMs softer 1-2x.\par \par 3/2/23: 18/26 fx: More fatigue, nausea, nocturia 2-3 x.  Flow better with tamsulosin 2 tabs at night,  AZO TID and aleve BID. Diarrhea x 2 today resolved with Imodium. Flow better with AZO TID and aleve BID. Denies dysuria or hot flashes.\par \par 3/8/23: 22/26 fx: Less fatigue and nausea, doing better now that he is taking some time off. Stable urgency/frequency. Took prochlorperazine x 1 a few days ago. Continues on tamsulosin 2 tabs at night,  AZO TID and aleve BID.

## 2023-03-09 PROCEDURE — 77387B: CUSTOM | Mod: 26

## 2023-03-10 PROCEDURE — 77387B: CUSTOM | Mod: 26

## 2023-03-13 PROCEDURE — 77427 RADIATION TX MANAGEMENT X5: CPT

## 2023-03-13 PROCEDURE — 77387B: CUSTOM | Mod: 26

## 2023-03-14 PROCEDURE — 77387B: CUSTOM | Mod: 26

## 2023-03-15 ENCOUNTER — TRANSCRIPTION ENCOUNTER (OUTPATIENT)
Age: 68
End: 2023-03-15

## 2023-03-20 ENCOUNTER — TRANSCRIPTION ENCOUNTER (OUTPATIENT)
Age: 68
End: 2023-03-20

## 2023-03-24 ENCOUNTER — NON-APPOINTMENT (OUTPATIENT)
Age: 68
End: 2023-03-24

## 2023-04-14 ENCOUNTER — OFFICE (OUTPATIENT)
Dept: URBAN - METROPOLITAN AREA CLINIC 35 | Facility: CLINIC | Age: 68
Setting detail: OPHTHALMOLOGY
End: 2023-04-14
Payer: COMMERCIAL

## 2023-04-14 ENCOUNTER — RX ONLY (RX ONLY)
Age: 68
End: 2023-04-14

## 2023-04-14 DIAGNOSIS — H11.153: ICD-10-CM

## 2023-04-14 DIAGNOSIS — H17.822: ICD-10-CM

## 2023-04-14 DIAGNOSIS — E11.9: ICD-10-CM

## 2023-04-14 DIAGNOSIS — H35.3131: ICD-10-CM

## 2023-04-14 DIAGNOSIS — Z79.84: ICD-10-CM

## 2023-04-14 DIAGNOSIS — H25.13: ICD-10-CM

## 2023-04-14 DIAGNOSIS — H35.033: ICD-10-CM

## 2023-04-14 PROCEDURE — 99214 OFFICE O/P EST MOD 30 MIN: CPT | Performed by: OPHTHALMOLOGY

## 2023-04-14 ASSESSMENT — LID EXAM ASSESSMENTS
OD_MEIBOMITIS: RLL 1+
OS_MEIBOMITIS: LLL 1+
OS_BLEPHARITIS: LUL 1+
OD_BLEPHARITIS: RUL 1+

## 2023-04-14 ASSESSMENT — VISUAL ACUITY
OS_BCVA: 20/20-1
OD_BCVA: 20/20

## 2023-04-14 ASSESSMENT — KERATOMETRY
OS_K1POWER_DIOPTERS: 43.50
OS_AXISANGLE_DEGREES: 094
OD_K1POWER_DIOPTERS: 44.50
OD_K2POWER_DIOPTERS: 44.550
OS_K2POWER_DIOPTERS: 44.00
OD_AXISANGLE_DEGREES: 090

## 2023-04-14 ASSESSMENT — REFRACTION_AUTOREFRACTION
OD_CYLINDER: +0.75
OD_SPHERE: -0.25
OS_SPHERE: +0.75
OS_AXIS: 115
OS_CYLINDER: +0.25
OD_AXIS: 037

## 2023-04-14 ASSESSMENT — CONFRONTATIONAL VISUAL FIELD TEST (CVF)
OD_FINDINGS: FULL
OS_FINDINGS: FULL

## 2023-04-14 ASSESSMENT — SPHEQUIV_DERIVED
OD_SPHEQUIV: 0.125
OS_SPHEQUIV: 0.875

## 2023-04-14 ASSESSMENT — AXIALLENGTH_DERIVED
OS_AL: 23.167
OD_AL: 23.1742

## 2023-04-14 ASSESSMENT — TONOMETRY
OD_IOP_MMHG: 18
OS_IOP_MMHG: 18

## 2023-04-25 NOTE — PHYSICAL EXAM
[Normal] : well developed, well nourished, in no acute distress [Sclera] : the sclera and conjunctiva were normal [Extraocular Movements] : extraocular movements were intact [Outer Ear] : the ears and nose were normal in appearance [Hearing Threshold Finger Rub Not [place]] : hearing was normal [Respiration, Rhythm And Depth] : normal respiratory rhythm and effort [Exaggerated Use Of Accessory Muscles For Inspiration] : no accessory muscle use [Arterial Pulses Normal] : the arterial pulses were normal [Abdomen Soft] : soft [Nondistended] : nondistended [Nail Clubbing] : no clubbing  or cyanosis of the fingernails [Range of Motion to Joints] : range of motion to joints [Skin Color & Pigmentation] : normal skin color and pigmentation [] : no rash [No Focal Deficits] : no focal deficits [Motor Exam] : the motor exam was normal [Oriented To Time, Place, And Person] : oriented to person, place, and time [Affect] : the affect was normal

## 2023-04-26 ENCOUNTER — APPOINTMENT (OUTPATIENT)
Dept: RADIATION ONCOLOGY | Facility: CLINIC | Age: 68
End: 2023-04-26
Payer: COMMERCIAL

## 2023-04-26 VITALS
HEIGHT: 74 IN | OXYGEN SATURATION: 97 % | HEART RATE: 75 BPM | TEMPERATURE: 96.98 F | RESPIRATION RATE: 17 BRPM | BODY MASS INDEX: 27.46 KG/M2 | WEIGHT: 214 LBS | SYSTOLIC BLOOD PRESSURE: 152 MMHG | DIASTOLIC BLOOD PRESSURE: 80 MMHG

## 2023-04-26 PROCEDURE — 99024 POSTOP FOLLOW-UP VISIT: CPT

## 2023-04-26 RX ORDER — PROCHLORPERAZINE MALEATE 10 MG/1
10 TABLET ORAL EVERY 6 HOURS
Qty: 60 | Refills: 2 | Status: DISCONTINUED | COMMUNITY
Start: 2023-03-02 | End: 2023-04-26

## 2023-04-26 RX ORDER — METHYLPREDNISOLONE 4 MG/1
4 TABLET ORAL
Qty: 1 | Refills: 0 | Status: DISCONTINUED | COMMUNITY
Start: 2023-02-08 | End: 2023-04-26

## 2023-04-26 RX ORDER — AMOXICILLIN AND CLAVULANATE POTASSIUM 875; 125 MG/1; MG/1
875-125 TABLET, COATED ORAL
Qty: 6 | Refills: 0 | Status: DISCONTINUED | COMMUNITY
Start: 2022-12-27 | End: 2023-04-26

## 2023-04-26 RX ORDER — FINASTERIDE 5 MG/1
5 TABLET, FILM COATED ORAL
Qty: 30 | Refills: 0 | Status: DISCONTINUED | COMMUNITY
Start: 2019-02-22 | End: 2023-04-26

## 2023-04-26 NOTE — DISEASE MANAGEMENT
[1] : T1 [c] : c [0] : M0 [0-10] : 0 -10 ng/mL [Biopsy with Fusion] : Patient had a biopsy with fusion on [8] : Fusion Biopsy Alvino Score: 8 [5] : 5 [Extracapsular Extension] : Extracapsular extension [IIIA] : IIIA [Pathological] : TNM Stage: p [] : Patient had no Bone Scan performed [BiopsyDate] : 8/25/22 [MeasuredProstateVolume] : 79cc [FreeTextEntry7] : MR prostate 7/25/22 showed a 5.7 x 4.7 x 5.6 [transverse x AP x CC] cm = 79 cc gland. Dominant lesion in right, posterior medial (PZpm), pwlwrrjb-uf-wgbb, peripheral zone (1cm, [place]-5), suspicious for GAIL; no SVI/LAD. Urethral deviation present, defect noted at bladder neck. (underwent TURP in 2019). \par \par Prostate biopsy 8/25/22 showed G8(4+4) disease in right posterior medial mid to apex (2/7, 10%) and also 1 core 7(4+3) (10%); 1/5 sites involved.\par \par Bone scan 9/9/22 negative for metastasis.  [TTNM] : 3a [NTNM] : 0 [MTNM] : 0

## 2023-04-26 NOTE — HISTORY OF PRESENT ILLNESS
[FreeTextEntry1] :  67-year-old male with high risk prostate cancer, rcT3a G8(4+4) iPSA 1.19 7/12/22. 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 Bone scan 9/9/22 negative for metastasis. Started on ADT in 10/2022 with up to 2 years planned, and definitive external beam radiation therapy to the prostate, seminal vesicles, and pelvic nodes recommended. The patient underwent a course of radiation therapy as outlined below. \par \par Radiation Treatment Summary: Prostate/SV/Nodes  7,020 cGy from 2/06/2023 to 3/14/2023 26 fx\par Clinical Course: Tolerated the treatment relatively well and developed the expected side effects, including exacerbation of his underlying urinary obstructive symptoms and increased urinary frequency,  urgency, and mild dysuria; treated with increased Flomax, aleve and AZO. Some nausea and loose stools managed with imodium and Compazine.\par \par 4/7/23 3 month ADT  and next due 7/7/23 with Dr. Chen\par \par 4/26/23: PTE\par Notes improvement in  symptoms and almost back to baseline. Nocturia 2 x, good stream, rare post void dribbling. .Getting exercise  but notes increased appetite. Denies hot flashes, dysuria, hematuria or bowel symptoms. Taking tamsulosin 2 tab at night. Not sexually active without issue  IPSS/QOL/EPIC Score. \par IPSS/QOL/EPIC score 4/1/10.\par \par \par \par \par \par \par \par

## 2023-04-26 NOTE — REVIEW OF SYSTEMS
[Constipation: Grade 0] : Constipation: Grade 0 [Rectal Pain: Grade 0] : Rectal Pain: Grade 0 [Hematuria: Grade 0] : Hematuria: Grade 0 [Urinary Retention: Grade 0] : Urinary Retention: Grade 0 [Urinary Tract Pain: Grade 0] : Urinary Tract Pain: Grade 0 [Urinary Urgency: Grade 1 - Present] : Urinary Urgency: Grade 1 - Present [Urinary Frequency: Grade 1 - Present] : Urinary Frequency: Grade 1 - Present [Urinary Incontinence: Grade 1 - Occasional (e.g., with coughing, sneezing, etc.), pads not indicated] : Urinary Incontinence: Grade 1 - Occasional (e.g., with coughing, sneezing, etc.), pads not indicated [Fatigue: Grade 1 - Fatigue relieved by rest] : Fatigue: Grade 1 - Fatigue relieved by rest [Diarrhea: Grade 0] : Diarrhea: Grade 0

## 2023-04-27 ENCOUNTER — NON-APPOINTMENT (OUTPATIENT)
Age: 68
End: 2023-04-27

## 2023-05-02 ENCOUNTER — NON-APPOINTMENT (OUTPATIENT)
Age: 68
End: 2023-05-02

## 2023-05-05 ENCOUNTER — NON-APPOINTMENT (OUTPATIENT)
Age: 68
End: 2023-05-05

## 2023-05-14 ENCOUNTER — NON-APPOINTMENT (OUTPATIENT)
Age: 68
End: 2023-05-14

## 2023-06-14 ENCOUNTER — LABORATORY RESULT (OUTPATIENT)
Age: 68
End: 2023-06-14

## 2023-06-15 ENCOUNTER — TRANSCRIPTION ENCOUNTER (OUTPATIENT)
Age: 68
End: 2023-06-15

## 2023-06-16 ENCOUNTER — TRANSCRIPTION ENCOUNTER (OUTPATIENT)
Age: 68
End: 2023-06-16

## 2023-08-01 ENCOUNTER — APPOINTMENT (OUTPATIENT)
Dept: RADIATION ONCOLOGY | Facility: CLINIC | Age: 68
End: 2023-08-01
Payer: COMMERCIAL

## 2023-08-01 VITALS
SYSTOLIC BLOOD PRESSURE: 133 MMHG | BODY MASS INDEX: 28.69 KG/M2 | TEMPERATURE: 96.8 F | DIASTOLIC BLOOD PRESSURE: 81 MMHG | HEIGHT: 74 IN | WEIGHT: 223.55 LBS | OXYGEN SATURATION: 95 % | HEART RATE: 80 BPM

## 2023-08-01 PROCEDURE — 99213 OFFICE O/P EST LOW 20 MIN: CPT | Mod: 25

## 2023-08-01 PROCEDURE — 99203 OFFICE O/P NEW LOW 30 MIN: CPT | Mod: 25

## 2023-08-01 NOTE — REASON FOR VISIT
[Post-Treatment Evaluation] : post-treatment evaluation for prostate cancer [Routine Follow-Up] : a routine follow-up visit for prostate cancer

## 2023-08-02 LAB — TESTOST SERPL-MCNC: 4 NG/DL

## 2023-08-02 NOTE — PHYSICAL EXAM
[] : no respiratory distress [Heart Rate And Rhythm] : heart rate and rhythm were normal [Abdomen Soft] : soft [Normal] : normal skin color and pigmentation and no rash [No Focal Deficits] : no focal deficits [Oriented To Time, Place, And Person] : oriented to person, place, and time [Sclera] : the sclera and conjunctiva were normal [Extraocular Movements] : extraocular movements were intact [Outer Ear] : the ears and nose were normal in appearance [Hearing Threshold Finger Rub Not [place]] : hearing was normal [Respiration, Rhythm And Depth] : normal respiratory rhythm and effort [Exaggerated Use Of Accessory Muscles For Inspiration] : no accessory muscle use [Arterial Pulses Normal] : the arterial pulses were normal [Edema] : no peripheral edema present [Nondistended] : nondistended [Musculoskeletal - Swelling] : no joint swelling [Nail Clubbing] : no clubbing  or cyanosis of the fingernails [Range of Motion to Joints] : range of motion to joints [Motor Tone] : muscle strength and tone were normal [Skin Color & Pigmentation] : normal skin color and pigmentation [Affect] : the affect was normal [Not Anxious] : not anxious

## 2023-08-02 NOTE — HISTORY OF PRESENT ILLNESS
[FreeTextEntry1] : 68-year-old male with high risk prostate cancer, rcT3a G8(4+4) iPSA 1.19 7/12/22. 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 Bone scan 9/9/22 negative for metastasis. Started on ADT in 10/2022 with up to 2 years planned, and definitive external beam radiation therapy to the prostate, seminal vesicles, and pelvic nodes recommended. The patient underwent a course of radiation therapy as outlined below.   Radiation Treatment Summary:  Prostate/SV/Nodes  7,020 cGy in 26 fx from 2/06/2023 - 3/14/2023  Clinical Course: Tolerated the treatment relatively well and developed the expected side effects, including exacerbation of his underlying urinary obstructive symptoms and increased urinary frequency,  urgency, and mild dysuria; treated with increased Flomax, aleve and AZO. Some nausea and loose stools managed with imodium and Compazine.  4/7/23 3 month ADT, Last dose 7/7/23 with Dr. Chen 4/26/23: PTE Notes improvement in  symptoms and almost back to baseline. Nocturia 2 x, good stream, rare post void dribbling. Getting exercise but notes increased appetite. Denies hot flashes, dysuria, hematuria or bowel symptoms. Taking tamsulosin 2 tab at night. Not sexually active without issue. IPSS/QOL/EPIC score 4/1/10  PSA trend:  iPSA 1.19 7/12/22 -> ADT started 10/2022 (2 y planned) -> RT complete 3/14/2023  6/15/23: PSA/T 0.01, 6.0 7/19/23 PSA <0.01; testosterone 4 8/1/23 8/1/23:  Notes improvement in  symptoms to baseline. Nocturia 2 x, good stream, rare post void dribbling. Getting exercise but notes increased appetite and bothersome weight gain. Denies hot flashes, dysuria, hematuria or bowel symptoms. Taking Tamsulosin 2 tab at night. Not sexually active without issue   Today IPSS/QOL/EPIC score 3/1/4

## 2023-08-02 NOTE — REVIEW OF SYSTEMS
[Constipation: Grade 0] : Constipation: Grade 0 [Diarrhea: Grade 0] : Diarrhea: Grade 0 [Urinary Tract Pain: Grade 0] : Urinary Tract Pain: Grade 0 [Urinary Urgency: Grade 0] : Urinary Urgency: Grade 0 [Urinary Frequency: Grade 0] : Urinary Frequency: Grade 0

## 2023-08-02 NOTE — DISEASE MANAGEMENT
[1] : T1 [c] : c [0] : M0 [0-10] : 0 -10 ng/mL [8] : Fusion Biopsy Alvino Score: 8 [Biopsy with Fusion] : Patient had a biopsy with fusion on [5] : 5 [Extracapsular Extension] : Extracapsular extension [IIIA] : IIIA [Pathological] : TNM Stage: p [Radiation Therapy] : Radiation Therapy [Treatment with radiation therapy] : Treatment with radiation therapy [EBRT] : EBRT [Treatment with androgen ablation] : Treatment with androgen ablation [] : Patient had no Bone Scan performed [BiopsyDate] : 8/25/22 [MeasuredProstateVolume] : 79cc [FreeTextEntry7] : MR prostate 7/25/22 showed a 5.7 x 4.7 x 5.6 [transverse x AP x CC] cm = 79 cc gland. Dominant lesion in right, posterior medial (PZpm), wzcpcclw-hm-bnsw, peripheral zone (1cm, SC-5), suspicious for GAIL; no SVI/LAD. Urethral deviation present, defect noted at bladder neck. (underwent TURP in 2019). \par  \par  Prostate biopsy 8/25/22 showed G8(4+4) disease in right posterior medial mid to apex (2/7, 10%) and also 1 core 7(4+3) (10%); 1/5 sites involved.\par  \par  Bone scan 9/9/22 negative for metastasis.  [LDR] : No LDR [HDR] : No HDR [RadiationCompletedDate] : 3/14/23 [EBRTDose] : 7066 [EBRTFractions] : 26 [ADTStartedDate] : 10/2022 [ADTDuration] : 2y [TTNM] : 3a [NTNM] : 0 [MTNM] : 0

## 2023-08-04 NOTE — OCCUPATIONAL THERAPY INITIAL EVALUATION ADULT - SHARP/DULL DISCRIMINATION, LUE, REHAB EVAL
paravaginal defects, no discharge present, no inflammatory lesions present, no masses present  Bladder: non-tender to palpation  Urethra: appears normal  Cervix: normal\, IUD string visualized   Uterus: normal size, shape and consistency  Adnexa: no adnexal tenderness present, no adnexal masses present  Perineum: perineum within normal limits, no evidence of trauma, no rashes or skin lesions present  Anus: anus within normal limits, no hemorrhoids present    Neurologic/Psychiatric  Mental Status:  Orientation: grossly oriented to person, place and time  Mood and Affect: mood normal, affect appropriate    Assessment:   Cha Valle  is a 41 y.o. Para 1 AA F   1. Abnormal uterine bleeding (AUB)  -We discussed the differential diagnosis to her abnormal uterine bleeding in detail. We will obtain thyroid studies as well as a complete blood count. We will obtain an ultrasound to assess for structural causes of her abnormal uterine bleeding. Patient currently with progestin IUD in place. This been present for 2 years. Patient initially had amenorrhea with her IUD. - Thyroid Cascade Profile  - CBC      Follow-up: Return for 1-2 for US for HMB with apt to follow . within normal limits

## 2023-08-30 ENCOUNTER — NON-APPOINTMENT (OUTPATIENT)
Age: 68
End: 2023-08-30

## 2023-09-15 ASSESSMENT — KOOS JR
BENDING TO THE FLOOR TO PICK UP OBJECT: MODERATE
IMPORTED KOOS JR SCORE: 91.97
KOOS JR RAW SCORE: 7
IMPORTED KOOS JR SCORE: 63.78
KOOS JR RAW SCORE: 3
STRAIGHTENING KNEE FULLY: MILD
KOOS JR RAW SCORE: 9
GOING UP OR DOWN STAIRS: MILD
IMPORTED KOOS JR SCORE: 68.28
RISING FROM SITTING: MILD
IMPORTED KOOS JR SCORE: 79.91
STANDING UPRIGHT: MILD
KOOS JR RAW SCORE: 1
BENDING TO THE FLOOR TO PICK UP OBJECT: MILD
IMPORTED LATERALITY: LEFT
RISING FROM SITTING: MODERATE
GOING UP OR DOWN STAIRS: MODERATE
TWISING OR PIVOTING ON KNEE: MILD
IMPORTED FORM: YES
HOW SEVERE IS YOUR KNEE STIFFNESS AFTER FIRST WAKING IN MORNING: MILD

## 2023-09-19 NOTE — H&P PST ADULT - AIRWAY
"    [facility] Medicine Services  History & Physical    Patient Name: Jermaine Black  : 1928  MRN: [id number]  Primary Care Physician:  Maria De Jesus, No Known  Date of admission: 2023  Date and Time of Service: 23 at 1103    Subjective      Chief Complaint: fall    History of Present Illness: Jermaine Black is a 89 y.o. male who presented to [facility] on 2023 complaining of fall this morning onto his bilateral knees. Patient states his \"legs gave out\" and he fell onto his knees but denies hitting his head or loss of consciousness. He denies any dizziness.  He states he has been experiencing pain in his bilateral knees since this fall but states it has improved to a \"minor pain\", he states the pain is localized to the kneecaps without radiation.  He denies any numbness or tingling in his bilateral lower extremities.  Patient states he has had a cough for the past 3 weeks but denies any sputum production.  He denies any nausea, vomiting, diarrhea, constipation, abdominal pain, shortness of air, chest pain, headache, dizziness, syncope, appetite changes, fevers or chills.  Patient states he does not drink alcohol, does not use per tobacco products and does not use illicit drugs.  Patient is oriented to person and time but not place.    In the ED,  All vitals stable on admission except /84. All labs unremarkable except high-sensitivity troponin 76 which trended down to 66, proBNP 9356, CO2 32, glucose 114, hemoglobin 12.8, respiratory panel positive for COVID-19. Patient received Lasix in ED. Hospitalist was contacted to admit patient for further care and management.     XR Knee 3 View Left    Result Date: 2023  Impression: Total left knee prosthesis without complicating features. Electronically Signed: Erik Baer MD  2023 6:27 AM EDT  Workstation ID: [id number]    CT Head Without Contrast    Result Date: 2023  Impression: 1.No acute intracranial " abnormality. 2.Moderate chronic small vessel ischemic change. Electronically Signed: Erik Baer MD  9/19/2023 6:56 AM EDT  Workstation ID: [id number]    XR Chest 1 View    Result Date: 9/19/2023  Impression: Mixed interstitial and airspace disease throughout the right lung and at the left lung base which may relate to pulmonary edema or multifocal atypical/viral pneumonia. Electronically Signed: Farzad Moss MD  9/19/2023 7:43 AM EDT  Workstation ID: [id number]     ECG 12 Lead Other; fatigue   Preliminary Result   HEART RATE= 72  bpm   RR Interval= 832  ms   RI Interval= 177  ms   P Horizontal Axis= -59  deg   P Front Axis=   deg   QRSD Interval= 89  ms   QT Interval= 413  ms   QTcB= 453  ms   QRS Axis= -18  deg   T Wave Axis= 41  deg   - ABNORMAL ECG -   Atrial-paced complexes   Inferior infarct, old   When compared with ECG of 30-Aug-2023 16:51:03,   Significant change in rhythm   Significant axis, voltage or hypertrophy change   Electronically Signed By:    Date and Time of Study: 2023-09-19 07:25:12            ROS 12 point ROS reviewed and negative except as mentioned above.      Personal History     No past medical history on file.    No past surgical history on file.    Family History: family history is not on file. Otherwise pertinent FHx was reviewed and not pertinent to current issue.    Social History:  reports that he has never smoked. He has never used smokeless tobacco. He reports that he does not drink alcohol and does not use drugs.    Home Medications:  Prior to Admission Medications       Prescriptions Last Dose Informant Patient Reported? Taking?    carbidopa-levodopa ER (SINEMET CR)  MG per tablet   Yes No    Take 1 tablet by mouth 3 (Three) Times a Day.    carboxymethylcellulose (REFRESH PLUS) 0.5 % solution   Yes No    Administer 1 drop to both eyes 2 (Two) Times a Day.    colesevelam (WELCHOL) 625 MG tablet   Yes No    Take 1 tablet by mouth 2 (Two) Times a Day With Meals.    dutasteride  (AVODART) 0.5 MG capsule   Yes No    Take 1 capsule by mouth Daily.    erythromycin (ROMYCIN) 5 MG/GM ophthalmic ointment   Yes No    Administer 1 application  to both eyes Every Night.    furosemide (LASIX) 40 MG tablet   Yes No    Take 1 tablet by mouth Daily.    isosorbide mononitrate (IMDUR) 60 MG 24 hr tablet   Yes No    Take 1 tablet by mouth Daily.    levothyroxine (SYNTHROID, LEVOTHROID) 75 MCG tablet   Yes No    Take 1 tablet by mouth Daily.    metoprolol succinate XL (TOPROL-XL) 50 MG 24 hr tablet   Yes No    Take 1 tablet by mouth Daily.    nitroglycerin (NITROSTAT) 0.4 MG SL tablet   Yes No    Place 1 tablet under the tongue Every 5 (Five) Minutes As Needed for Chest Pain. Take no more than 3 doses in 15 minutes.    Pimavanserin Tartrate (Nuplazid) 34 MG capsule   Yes No    Take 1 capsule by mouth Every Night.    rosuvastatin (CRESTOR) 10 MG tablet   Yes No    Take 1 tablet by mouth Daily.    tamsulosin (FLOMAX) 0.4 MG capsule 24 hr capsule   Yes No    Take 1 capsule by mouth Daily.              Allergies:  No Known Allergies    Objective      Vitals:   Temp:  [99.7 °F (37.6 °C)] 99.7 °F (37.6 °C)  Heart Rate:  [68-79] 79  Resp:  [20] 20  BP: (123-172)/(57-88) 172/88  Flow (L/min):  [1-4] 2    Physical Exam  Vitals and nursing note reviewed.   HENT:      Head: Normocephalic.   Eyes:      Extraocular Movements: Extraocular movements intact.      Pupils: Pupils are equal, round, and reactive to light.   Cardiovascular:      Rate and Rhythm: Normal rate and regular rhythm.   Pulmonary:      Effort: Pulmonary effort is normal.      Breath sounds: Normal breath sounds. Examination of the right-lower field reveals decreased breath sounds. Examination of the left-lower field reveals decreased breath sounds.      Comments: Patient on 2L of oxygen via nasal cannula  Abdominal:      General: Bowel sounds are normal.      Palpations: Abdomen is soft.      Tenderness: There is no abdominal tenderness.    Musculoskeletal:         General: Normal range of motion.      Comments: Mildly tender to palpation over left knee   Skin:     General: Skin is warm and dry.      Comments: Small abrasion approximately 0.5 inch on right patella  Small ecchymosis over left knee   Neurological:      Mental Status: He is alert. He is disoriented.      Motor: Motor function is intact.   Psychiatric:         Mood and Affect: Mood normal.        Result Review    Result Review:  I have personally reviewed the results from the time of this admission to 9/19/2023 13:39 EDT and agree with these findings:  [x]  Laboratory  [x]  Microbiology  [x]  Radiology  [x]  EKG/Telemetry   []  Cardiology/Vascular   []  Pathology  []  Old records  []  Other:  Most notable findings include:     CBC:      Lab 09/19/23  0729   WBC 7.30   HEMOGLOBIN 12.8*   HEMATOCRIT 38.8   PLATELETS 151   NEUTROS ABS 6.10   LYMPHS ABS 0.20*   MONOS ABS 0.90   EOS ABS 0.00   MCV 98.7*     CMP:        Lab 09/19/23  0729 09/15/23  0500   SODIUM 141 141   POTASSIUM 4.3 4.3   CHLORIDE 103 106   CO2 32.0* 28.0   ANION GAP 6.0 7.0   BUN 19 18   CREATININE 1.06 0.87   EGFR 64.2 79.0   GLUCOSE 114* 89   CALCIUM 9.2 8.6         Assessment & Plan        Active Hospital Problems:  Active Hospital Problems    Diagnosis     **COVID-19     COVID-19 virus detected      Plan:     Weakness  Fall  Bilateral knee pain, left greater than right, improving  Parkinson's disease  Patient denies hitting his head  CT head radiology report states no acute intracranial abnormality, moderate chronic small vessel ischemic change  PT OT consulted  X-ray of left knee radiology report reveals total left knee prosthesis without complicating features  Tylenol ordered for pain as needed  Continue home medications for parkinson's disease  Weakness likely related to Covid-19 infection    COVID-19 infection  Respiratory panel positive for COVID-19  Patient currently on 2 L of oxygen via nasal cannula with SPO2  of 95%  C-reactive protein, D-dimer, fibrinogen, ferritin, lactate dehydrogenase labs ordered  Tessalon capsule ordered for cough  Symbicort ordered twice daily, per discussion with attending physician  Solu-Medrol 80 mg daily for 2 days  ABG ordered  Per RN report, patient is coughing significantly after drinking water. We will make patient n.p.o. for now given coughing with water drinking and will consult SLP for further evaluation    Congestive heart failure, in acute exacerbation  proBNP 9356  Chest x-ray radiology report shows mixed interstitial and airspace disease throughout the right lung and at the left lung base which may relate to pulmonary edema or multifocal atypical/viral pneumonia  Lasix given in ED  Daily weights ordered  Strict intake and output ordered  Heart failure pathway initiated  Lasix 40 mg ordered twice daily for 2 days, hold home oral Lasix    Elevated troponin  Presence of pacemaker  High-sensitivity troponin 76 which trended down to 66  EKG shows atrial paced complexes, inferior infarct, old  Patient denies any chest pain  We will obtain additional troponin  Continue to monitor  Continuous cardiac monitoring ordered    History of CAD  Essential hypertension  /84  Continue to monitor BP  Continue home medications   As needed hydralazine ordered for SBP greater than 160     Hypothyroidism  Continue home medications     BPH  Continue home medications    TOM  Patient states he has sleep apnea but is non-compliant with CPAP use    DVT prophylaxis:  Medical DVT prophylaxis orders are present.    CODE STATUS:    Code Status (Patient has no pulse and is not breathing): CPR (Attempt to Resuscitate)  Medical Interventions (Patient has pulse or is breathing): Full Support    Admission Status:  I believe this patient meets inpatient status.    I discussed the patient's findings and my recommendations with patient and attending physician Dr. Staley .    This patient has been examined wearing  appropriate Personal Protective Equipment and discussed with  attending physician Dr. Staley . 09/19/23      Signature: Electronically signed by Lorena Rosas PA-C, 09/19/23, 13:39 EDT.  [facility]   normal

## 2023-10-04 ENCOUNTER — APPOINTMENT (OUTPATIENT)
Dept: ULTRASOUND IMAGING | Facility: IMAGING CENTER | Age: 68
End: 2023-10-04
Payer: COMMERCIAL

## 2023-10-04 ENCOUNTER — OUTPATIENT (OUTPATIENT)
Dept: OUTPATIENT SERVICES | Facility: HOSPITAL | Age: 68
LOS: 1 days | End: 2023-10-04
Payer: COMMERCIAL

## 2023-10-04 DIAGNOSIS — Z00.8 ENCOUNTER FOR OTHER GENERAL EXAMINATION: ICD-10-CM

## 2023-10-04 DIAGNOSIS — Z98.89 OTHER SPECIFIED POSTPROCEDURAL STATES: Chronic | ICD-10-CM

## 2023-10-04 DIAGNOSIS — Z96.653 PRESENCE OF ARTIFICIAL KNEE JOINT, BILATERAL: Chronic | ICD-10-CM

## 2023-10-04 PROCEDURE — 93880 EXTRACRANIAL BILAT STUDY: CPT

## 2023-10-04 PROCEDURE — 93978 VASCULAR STUDY: CPT | Mod: 26

## 2023-10-04 PROCEDURE — 93978 VASCULAR STUDY: CPT

## 2023-10-04 PROCEDURE — 93880 EXTRACRANIAL BILAT STUDY: CPT | Mod: 26

## 2023-11-14 NOTE — H&P PST ADULT - SYMPTOMS
Vitals capture started with the following parameters, Patient=Adult, Interval=5 min, Initial Pressure=180 mmHg, Deflation Rate=5 mmHg, Cuff placed on Left Arm none

## 2023-11-17 ENCOUNTER — APPOINTMENT (OUTPATIENT)
Dept: NEPHROLOGY | Facility: CLINIC | Age: 68
End: 2023-11-17
Payer: COMMERCIAL

## 2023-11-17 VITALS
TEMPERATURE: 97.5 F | SYSTOLIC BLOOD PRESSURE: 122 MMHG | BODY MASS INDEX: 28.58 KG/M2 | HEART RATE: 78 BPM | WEIGHT: 222.66 LBS | HEIGHT: 74 IN | DIASTOLIC BLOOD PRESSURE: 70 MMHG | OXYGEN SATURATION: 98 %

## 2023-11-17 DIAGNOSIS — R80.9 PROTEINURIA, UNSPECIFIED: ICD-10-CM

## 2023-11-17 DIAGNOSIS — E78.5 HYPERLIPIDEMIA, UNSPECIFIED: ICD-10-CM

## 2023-11-17 DIAGNOSIS — I10 ESSENTIAL (PRIMARY) HYPERTENSION: ICD-10-CM

## 2023-11-17 DIAGNOSIS — E11.9 TYPE 2 DIABETES MELLITUS W/OUT COMPLICATIONS: ICD-10-CM

## 2023-11-17 PROCEDURE — 99203 OFFICE O/P NEW LOW 30 MIN: CPT

## 2023-11-17 RX ORDER — ATORVASTATIN CALCIUM 20 MG/1
20 TABLET, FILM COATED ORAL
Refills: 0 | Status: ACTIVE | COMMUNITY
Start: 2023-11-17

## 2023-11-20 LAB
APPEARANCE: CLEAR
BILIRUBIN URINE: NEGATIVE
BLOOD URINE: NEGATIVE
COLOR: YELLOW
CREAT SPEC-SCNC: 77 MG/DL
CREAT/PROT UR: 0.1 RATIO
ESTIMATED AVERAGE GLUCOSE: 146 MG/DL
GLUCOSE QUALITATIVE U: NEGATIVE MG/DL
HBA1C MFR BLD HPLC: 6.7 %
KETONES URINE: NEGATIVE MG/DL
LEUKOCYTE ESTERASE URINE: NEGATIVE
NITRITE URINE: NEGATIVE
PH URINE: 5.5
PROT UR-MCNC: 10 MG/DL
PROTEIN URINE: NEGATIVE MG/DL
SPECIFIC GRAVITY URINE: 1.02
UROBILINOGEN URINE: 0.2 MG/DL

## 2023-11-21 LAB
ALBUMIN MFR SERPL ELPH: 56 %
ALBUMIN SERPL-MCNC: 4.1 G/DL
ALBUMIN/GLOB SERPL: 1.2 RATIO
ALPHA1 GLOB MFR SERPL ELPH: 4.5 %
ALPHA1 GLOB SERPL ELPH-MCNC: 0.3 G/DL
ALPHA2 GLOB MFR SERPL ELPH: 11.6 %
ALPHA2 GLOB SERPL ELPH-MCNC: 0.9 G/DL
B-GLOBULIN MFR SERPL ELPH: 11.3 %
B-GLOBULIN SERPL ELPH-MCNC: 0.8 G/DL
DEPRECATED KAPPA LC FREE/LAMBDA SER: 1.98 RATIO
GAMMA GLOB FLD ELPH-MCNC: 1.2 G/DL
GAMMA GLOB MFR SERPL ELPH: 16.6 %
INTERPRETATION SERPL IEP-IMP: NORMAL
KAPPA LC CSF-MCNC: 1.64 MG/DL
KAPPA LC SERPL-MCNC: 3.24 MG/DL
PROT SERPL-MCNC: 7.4 G/DL
PROT SERPL-MCNC: 7.4 G/DL

## 2024-01-17 ENCOUNTER — NON-APPOINTMENT (OUTPATIENT)
Age: 69
End: 2024-01-17

## 2024-01-18 LAB
PSA SERPL-MCNC: <0.01 NG/ML
TESTOST SERPL-MCNC: 4.9 NG/DL

## 2024-01-20 ENCOUNTER — NON-APPOINTMENT (OUTPATIENT)
Age: 69
End: 2024-01-20

## 2024-01-28 ENCOUNTER — OUTPATIENT (OUTPATIENT)
Dept: OUTPATIENT SERVICES | Facility: HOSPITAL | Age: 69
LOS: 1 days | End: 2024-01-28
Payer: COMMERCIAL

## 2024-01-28 ENCOUNTER — APPOINTMENT (OUTPATIENT)
Dept: RADIOLOGY | Facility: IMAGING CENTER | Age: 69
End: 2024-01-28
Payer: COMMERCIAL

## 2024-01-28 DIAGNOSIS — Z98.89 OTHER SPECIFIED POSTPROCEDURAL STATES: Chronic | ICD-10-CM

## 2024-01-28 DIAGNOSIS — Z96.653 PRESENCE OF ARTIFICIAL KNEE JOINT, BILATERAL: Chronic | ICD-10-CM

## 2024-01-28 DIAGNOSIS — Z00.8 ENCOUNTER FOR OTHER GENERAL EXAMINATION: ICD-10-CM

## 2024-01-28 PROCEDURE — 71046 X-RAY EXAM CHEST 2 VIEWS: CPT | Mod: 26

## 2024-01-28 PROCEDURE — 71046 X-RAY EXAM CHEST 2 VIEWS: CPT

## 2024-02-06 ENCOUNTER — APPOINTMENT (OUTPATIENT)
Dept: RADIATION ONCOLOGY | Facility: CLINIC | Age: 69
End: 2024-02-06
Payer: COMMERCIAL

## 2024-02-06 VITALS
WEIGHT: 211.96 LBS | RESPIRATION RATE: 17 BRPM | DIASTOLIC BLOOD PRESSURE: 80 MMHG | HEIGHT: 74 IN | HEART RATE: 92 BPM | BODY MASS INDEX: 27.2 KG/M2 | OXYGEN SATURATION: 98 % | SYSTOLIC BLOOD PRESSURE: 137 MMHG | TEMPERATURE: 96.7 F

## 2024-02-06 DIAGNOSIS — C61 MALIGNANT NEOPLASM OF PROSTATE: ICD-10-CM

## 2024-02-06 PROCEDURE — 99213 OFFICE O/P EST LOW 20 MIN: CPT

## 2024-02-06 NOTE — PHYSICAL EXAM
[Normal] : well developed, well nourished, in no acute distress [Sclera] : the sclera and conjunctiva were normal [Extraocular Movements] : extraocular movements were intact [Outer Ear] : the ears and nose were normal in appearance [Hearing Threshold Finger Rub Not [place]] : hearing was normal [] : no respiratory distress [Respiration, Rhythm And Depth] : normal respiratory rhythm and effort [Exaggerated Use Of Accessory Muscles For Inspiration] : no accessory muscle use [Arterial Pulses Normal] : the arterial pulses were normal [Edema] : no peripheral edema present [Abdomen Soft] : soft [Nondistended] : nondistended [Musculoskeletal - Swelling] : no joint swelling [Nail Clubbing] : no clubbing  or cyanosis of the fingernails [Range of Motion to Joints] : range of motion to joints [Motor Tone] : muscle strength and tone were normal [Skin Color & Pigmentation] : normal skin color and pigmentation [No Focal Deficits] : no focal deficits [Oriented To Time, Place, And Person] : oriented to person, place, and time [Affect] : the affect was normal [Not Anxious] : not anxious [de-identified] : coarse breath sounds in left lung base, but good air movement, no consolidation

## 2024-02-06 NOTE — REVIEW OF SYSTEMS
[Constipation: Grade 0] : Constipation: Grade 0 [Diarrhea: Grade 0] : Diarrhea: Grade 0 [Urinary Tract Pain: Grade 0] : Urinary Tract Pain: Grade 0 [Urinary Urgency: Grade 0] : Urinary Urgency: Grade 0 [Urinary Frequency: Grade 0] : Urinary Frequency: Grade 0 [Negative] : Psychiatric [Hematuria: Grade 0] : Hematuria: Grade 0 [Urinary Incontinence: Grade 0] : Urinary Incontinence: Grade 0  [Urinary Retention: Grade 0] : Urinary Retention: Grade 0 [Ejaculation Disorder: Grade 1 - Diminished ejaculation] : Ejaculation Disorder: Grade 1 - Diminished ejaculation  [Erectile Dysfunction: Grade 1 - Decrease in erectile function (frequency or rigidity of erections) but intervention not indicated (e.g., medication or use of mechanical device, penile pump)] : Erectile Dysfunction: Grade 1 - Decrease in erectile function (frequency or rigidity of erections) but intervention not indicated (e.g., medication or use of mechanical device, penile pump) [Fatigue] : fatigue

## 2024-02-06 NOTE — DISEASE MANAGEMENT
[1] : T1 [c] : c [0] : M0 [0-10] : 0 -10 ng/mL [Biopsy with Fusion] : Patient had a biopsy with fusion on [8] : Fusion Biopsy Alvino Score: 8 [5] : 5 [Extracapsular Extension] : Extracapsular extension [IIIA] : IIIA [Radiation Therapy] : Radiation Therapy [Treatment with radiation therapy] : Treatment with radiation therapy [EBRT] : EBRT [Treatment with androgen ablation] : Treatment with androgen ablation [Pathological] : TNM Stage: p [] : Patient had no Bone Scan performed [BiopsyDate] : 8/25/22 [MeasuredProstateVolume] : 79cc [FreeTextEntry7] : MR prostate 7/25/22 showed a 5.7 x 4.7 x 5.6 [transverse x AP x CC] cm = 79 cc gland. Dominant lesion in right, posterior medial (PZpm), [id number], peripheral zone (1cm, VA-5), suspicious for GAIL; no SVI/LAD. Urethral deviation present, defect noted at bladder neck. (underwent TURP in 2019). \par  \par  Prostate biopsy 8/25/22 showed G8(4+4) disease in right posterior medial mid to apex (2/7, 10%) and also 1 core 7(4+3) (10%); 1/5 sites involved.\par  \par  Bone scan 9/9/22 negative for metastasis.  [LDR] : No LDR [HDR] : No HDR [RadiationCompletedDate] : 3/14/23 [EBRTDose] : 7008 [EBRTFractions] : 26 [ADTStartedDate] : 10/2022 [ADTDuration] : 2y [TTNM] : 3a [NTNM] : 0 [MTNM] : 0

## 2024-02-06 NOTE — HISTORY OF PRESENT ILLNESS
[FreeTextEntry1] : 68-year-old male with high risk prostate cancer, rcT3a G8(4+4) iPSA 1.19 7/12/22. 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 Bone scan 9/9/22 negative for metastasis. Baseline IPSS/EPIC Score 12/5. Started on ADT in 10/2022 with up to 2 years planned, and definitive external beam radiation therapy to the prostate, seminal vesicles, and pelvic nodes recommended. The patient underwent a course of radiation therapy as outlined below.   Radiation Treatment Summary:  Prostate/SV/Nodes  7,020 cGy in 26 fx from 2/06/2023 - 3/14/2023  Clinical Course: Tolerated the treatment relatively well and developed the expected side effects, including exacerbation of his underlying urinary obstructive symptoms and increased urinary frequency,  urgency, and mild dysuria; treated with increased Flomax, aleve and AZO. Some nausea and loose stools managed with imodium and Compazine.  4/7/23 3 month ADT, Last dose 7/7/23 with Dr. Chen 4/26/23: PTE Notes improvement in  symptoms and almost back to baseline. Nocturia 2 x, good stream, rare post void dribbling. Getting exercise but notes increased appetite. Denies hot flashes, dysuria, hematuria or bowel symptoms. Taking tamsulosin 2 tab at night. Not sexually active without issue. IPSS/QOL/EPIC score 4/1/10  8/1/23:  Notes improvement in  symptoms to baseline. Nocturia 2 x, good stream, rare post void dribbling. Getting exercise but notes increased appetite and bothersome weight gain. Denies hot flashes, dysuria, hematuria or bowel symptoms. Taking Tamsulosin 2 tab at night. Not sexually active without issue   Today IPSS/QOL/EPIC score 3/1/4  PSA trend:  iPSA 1.19 7/12/22 -> ADT started 10/2022 (2 y planned) -> RT complete 3/14/2023  6/15/23: PSA/T 0.01, 6.0 7/19/23 PSA <0.01; testosterone 4 8/1/23 1/18/24 PSA <0.01; testosterone 4.9  Followup 2/6/24:  Patient reports nocturia twice per night and denies any other urinary or bowel issues and he is not sexually active. Has fatigue from ADT and occasional hot flashes.  The patient stated he was recently diagnosed with pneumonia through a chest x-ray by his PCP. He was started on antibiotics, steroids, and cough medication. He reports his symptoms have improved. IPSS/QOL/EPIC score 3/1/1

## 2024-03-11 RX ORDER — METHYLPREDNISOLONE 4 MG/1
4 TABLET ORAL
Qty: 1 | Refills: 0 | Status: ACTIVE | COMMUNITY
Start: 2024-03-11 | End: 1900-01-01

## 2024-04-12 ENCOUNTER — OFFICE (OUTPATIENT)
Dept: URBAN - METROPOLITAN AREA CLINIC 35 | Facility: CLINIC | Age: 69
Setting detail: OPHTHALMOLOGY
End: 2024-04-12
Payer: COMMERCIAL

## 2024-04-12 ENCOUNTER — RX ONLY (RX ONLY)
Age: 69
End: 2024-04-12

## 2024-04-12 DIAGNOSIS — H11.153: ICD-10-CM

## 2024-04-12 DIAGNOSIS — Z79.84: ICD-10-CM

## 2024-04-12 DIAGNOSIS — E11.9: ICD-10-CM

## 2024-04-12 DIAGNOSIS — H25.13: ICD-10-CM

## 2024-04-12 DIAGNOSIS — H35.033: ICD-10-CM

## 2024-04-12 DIAGNOSIS — H35.3131: ICD-10-CM

## 2024-04-12 DIAGNOSIS — H17.822: ICD-10-CM

## 2024-04-12 PROCEDURE — 92014 COMPRE OPH EXAM EST PT 1/>: CPT | Performed by: OPHTHALMOLOGY

## 2024-04-16 ENCOUNTER — OUTPATIENT (OUTPATIENT)
Dept: OUTPATIENT SERVICES | Facility: HOSPITAL | Age: 69
LOS: 1 days | End: 2024-04-16
Payer: COMMERCIAL

## 2024-04-16 ENCOUNTER — APPOINTMENT (OUTPATIENT)
Dept: CT IMAGING | Facility: IMAGING CENTER | Age: 69
End: 2024-04-16
Payer: COMMERCIAL

## 2024-04-16 DIAGNOSIS — Z00.8 ENCOUNTER FOR OTHER GENERAL EXAMINATION: ICD-10-CM

## 2024-04-16 DIAGNOSIS — Z98.89 OTHER SPECIFIED POSTPROCEDURAL STATES: Chronic | ICD-10-CM

## 2024-04-16 DIAGNOSIS — Z96.653 PRESENCE OF ARTIFICIAL KNEE JOINT, BILATERAL: Chronic | ICD-10-CM

## 2024-04-16 PROCEDURE — 74176 CT ABD & PELVIS W/O CONTRAST: CPT | Mod: 26

## 2024-04-16 PROCEDURE — 74176 CT ABD & PELVIS W/O CONTRAST: CPT

## 2024-08-15 ENCOUNTER — APPOINTMENT (OUTPATIENT)
Dept: RADIATION ONCOLOGY | Facility: CLINIC | Age: 69
End: 2024-08-15

## 2024-08-22 ENCOUNTER — APPOINTMENT (OUTPATIENT)
Dept: RADIATION ONCOLOGY | Facility: CLINIC | Age: 69
End: 2024-08-22
Payer: COMMERCIAL

## 2024-08-22 VITALS
BODY MASS INDEX: 27.98 KG/M2 | HEIGHT: 74 IN | TEMPERATURE: 95.18 F | WEIGHT: 218 LBS | DIASTOLIC BLOOD PRESSURE: 79 MMHG | HEART RATE: 71 BPM | SYSTOLIC BLOOD PRESSURE: 129 MMHG | OXYGEN SATURATION: 98 % | RESPIRATION RATE: 16 BRPM

## 2024-08-22 PROCEDURE — 99214 OFFICE O/P EST MOD 30 MIN: CPT

## 2024-08-26 NOTE — PHYSICAL EXAM
[Normal] : well developed, well nourished, in no acute distress [Sclera] : the sclera and conjunctiva were normal [Extraocular Movements] : extraocular movements were intact [Outer Ear] : the ears and nose were normal in appearance [Hearing Threshold Finger Rub Not [place]] : hearing was normal [] : no respiratory distress [Respiration, Rhythm And Depth] : normal respiratory rhythm and effort [Exaggerated Use Of Accessory Muscles For Inspiration] : no accessory muscle use [Arterial Pulses Normal] : the arterial pulses were normal [Edema] : no peripheral edema present [Abdomen Soft] : soft [Nondistended] : nondistended [Musculoskeletal - Swelling] : no joint swelling [Nail Clubbing] : no clubbing  or cyanosis of the fingernails [Range of Motion to Joints] : range of motion to joints [Motor Tone] : muscle strength and tone were normal [Skin Color & Pigmentation] : normal skin color and pigmentation [No Focal Deficits] : no focal deficits [Oriented To Time, Place, And Person] : oriented to person, place, and time [Not Anxious] : not anxious [Affect] : the affect was normal [de-identified] : coarse breath sounds in left lung base, but good air movement, no consolidation

## 2024-08-26 NOTE — DISEASE MANAGEMENT
Called patient to confirm surgery 05/29/2020.  Advise pt. To make an appt to see PCP for medical clearance.  Informed pt. A nurse at [facility] will be calling to arrange Covid-19 testing within 48 to 72 hrs before surgery day.    Patient is having a difficult time making an appt to see PCP for H&P.  Pt. May need to reschedule surgery.    Informed pt. I'm unable to change first name to Cassie with out a legal document stating her new name. She's going to send via [employer] a copy of state I.D. or D.L.   [1] : T1 [c] : c [0] : M0 [0-10] : 0 -10 ng/mL [Biopsy with Fusion] : Patient had a biopsy with fusion on [8] : Fusion Biopsy Alvino Score: 8 [5] : 5 [Extracapsular Extension] : Extracapsular extension [IIIA] : IIIA [Radiation Therapy] : Radiation Therapy [Treatment with radiation therapy] : Treatment with radiation therapy [EBRT] : EBRT [Treatment with androgen ablation] : Treatment with androgen ablation [Pathological] : TNM Stage: p [] : Patient had no Bone Scan performed [BiopsyDate] : 8/25/22 [MeasuredProstateVolume] : 79cc [FreeTextEntry7] : MR prostate 7/25/22 showed a 5.7 x 4.7 x 5.6 [transverse x AP x CC] cm = 79 cc gland. Dominant lesion in right, posterior medial (PZpm), [id number], peripheral zone (1cm, FL-5), suspicious for GAIL; no SVI/LAD. Urethral deviation present, defect noted at bladder neck. (underwent TURP in 2019). \par  \par  Prostate biopsy 8/25/22 showed G8(4+4) disease in right posterior medial mid to apex (2/7, 10%) and also 1 core 7(4+3) (10%); 1/5 sites involved.\par  \par  Bone scan 9/9/22 negative for metastasis.  [LDR] : No LDR [HDR] : No HDR [RadiationCompletedDate] : 3/14/23 [EBRTDose] : 7028 [EBRTFractions] : 26 [ADTStartedDate] : 10/2022 [ADTDuration] : 2y [TTNM] : 3a [NTNM] : 0 [MTNM] : 0

## 2024-08-26 NOTE — PHYSICAL EXAM
[Normal] : well developed, well nourished, in no acute distress [Sclera] : the sclera and conjunctiva were normal [Extraocular Movements] : extraocular movements were intact [Outer Ear] : the ears and nose were normal in appearance [Hearing Threshold Finger Rub Not [place]] : hearing was normal [] : no respiratory distress [Respiration, Rhythm And Depth] : normal respiratory rhythm and effort [Exaggerated Use Of Accessory Muscles For Inspiration] : no accessory muscle use [Arterial Pulses Normal] : the arterial pulses were normal [Edema] : no peripheral edema present [Abdomen Soft] : soft [Nondistended] : nondistended [Musculoskeletal - Swelling] : no joint swelling [Nail Clubbing] : no clubbing  or cyanosis of the fingernails [Range of Motion to Joints] : range of motion to joints [Motor Tone] : muscle strength and tone were normal [Skin Color & Pigmentation] : normal skin color and pigmentation [No Focal Deficits] : no focal deficits [Oriented To Time, Place, And Person] : oriented to person, place, and time [Affect] : the affect was normal [Not Anxious] : not anxious [de-identified] : coarse breath sounds in left lung base, but good air movement, no consolidation

## 2024-08-26 NOTE — REVIEW OF SYSTEMS
[Fatigue] : fatigue [Negative] : Psychiatric [Constipation: Grade 0] : Constipation: Grade 0 [Diarrhea: Grade 0] : Diarrhea: Grade 0 [Hematuria: Grade 0] : Hematuria: Grade 0 [Urinary Incontinence: Grade 0] : Urinary Incontinence: Grade 0  [Urinary Retention: Grade 0] : Urinary Retention: Grade 0 [Urinary Tract Pain: Grade 0] : Urinary Tract Pain: Grade 0 [Urinary Urgency: Grade 0] : Urinary Urgency: Grade 0 [Urinary Frequency: Grade 0] : Urinary Frequency: Grade 0 [Ejaculation Disorder: Grade 1 - Diminished ejaculation] : Ejaculation Disorder: Grade 1 - Diminished ejaculation  [Erectile Dysfunction: Grade 1 - Decrease in erectile function (frequency or rigidity of erections) but intervention not indicated (e.g., medication or use of mechanical device, penile pump)] : Erectile Dysfunction: Grade 1 - Decrease in erectile function (frequency or rigidity of erections) but intervention not indicated (e.g., medication or use of mechanical device, penile pump) [Nocturia] : nocturia [Urinary Frequency] : urinary frequency [IPSS Score (0-40): ___] : IPSS score: [unfilled] [EPIC-CP Score (0-60): ___] : EPIC-CP score: [unfilled] [FreeTextEntry8] : bothered by urinary frequency

## 2024-08-26 NOTE — HISTORY OF PRESENT ILLNESS
[FreeTextEntry1] : Mr. Juarez was treated under the care of Dr. Lambert Lopez and I am seeing him in follow up.   68-year-old male with high risk prostate cancer, rcT3a G8(4+4) iPSA 1.19 7/12/22. 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 Bone scan 9/9/22 negative for metastasis. Baseline IPSS/EPIC Score 12/5. Started on ADT in 10/2022 with up to 2 years planned, and definitive external beam radiation therapy to the prostate, seminal vesicles, and pelvic nodes recommended. The patient underwent a course of radiation therapy as outlined below.   Radiation Treatment Summary:  Prostate/SV/Nodes  7,020 cGy in 26 fx from 2/06/2023 - 3/14/2023  Clinical Course: Tolerated the treatment relatively well and developed the expected side effects, including exacerbation of his underlying urinary obstructive symptoms and increased urinary frequency,  urgency, and mild dysuria; treated with increased Flomax, aleve and AZO. Some nausea and loose stools managed with imodium and Compazine.  4/7/23 3 month ADT, Last dose 7/7/23 with Dr. Chen 4/26/23: PTE Notes improvement in  symptoms and almost back to baseline. Nocturia 2 x, good stream, rare post void dribbling. Getting exercise but notes increased appetite. Denies hot flashes, dysuria, hematuria or bowel symptoms. Taking tamsulosin 2 tab at night. Not sexually active without issue. IPSS/QOL/EPIC score 4/1/10  8/1/23:  Notes improvement in  symptoms to baseline. Nocturia 2 x, good stream, rare post void dribbling. Getting exercise but notes increased appetite and bothersome weight gain. Denies hot flashes, dysuria, hematuria or bowel symptoms. Taking Tamsulosin 2 tab at night. Not sexually active without issue   Today IPSS/QOL/EPIC score 3/1/4  PSA trend:  iPSA 1.19 7/12/22 -> ADT started 10/2022 (2 y planned) -> RT complete 3/14/2023  6/15/23: PSA/T 0.01, 6.0 7/19/23 PSA <0.01; testosterone 4 8/1/23 1/18/24 PSA <0.01; testosterone 4.9 7/31/2024 7/31/24 PSA <.01  testosterone 2.7  Followup 2/6/24:  Patient reports nocturia twice per night and denies any other urinary or bowel issues and he is not sexually active. Has fatigue from ADT and occasional hot flashes.  The patient stated he was recently diagnosed with pneumonia through a chest x-ray by his PCP. He was started on antibiotics, steroids, and cough medication. He reports his symptoms have improved. IPSS/QOL/EPIC score 3/1/1  8/22/2024- Mr. Juarez presents today for follow up. He was previously a patient of Dr. Lambert Lopez who I am seeing in follow up.  IPSS 7, EPIC 12, QOL 1.  Last eligard 1 month ago.  Continues to note some frequency.  7/31/24 PSA <.01  testosterone 2.7

## 2024-08-26 NOTE — DISEASE MANAGEMENT
[1] : T1 [c] : c [0] : M0 [0-10] : 0 -10 ng/mL [Biopsy with Fusion] : Patient had a biopsy with fusion on [8] : Fusion Biopsy Alvino Score: 8 [5] : 5 [Extracapsular Extension] : Extracapsular extension [IIIA] : IIIA [Radiation Therapy] : Radiation Therapy [Treatment with radiation therapy] : Treatment with radiation therapy [EBRT] : EBRT [Treatment with androgen ablation] : Treatment with androgen ablation [Pathological] : TNM Stage: p [] : Patient had no Bone Scan performed [BiopsyDate] : 8/25/22 [MeasuredProstateVolume] : 79cc [FreeTextEntry7] : MR prostate 7/25/22 showed a 5.7 x 4.7 x 5.6 [transverse x AP x CC] cm = 79 cc gland. Dominant lesion in right, posterior medial (PZpm), [id number], peripheral zone (1cm, CA-5), suspicious for GAIL; no SVI/LAD. Urethral deviation present, defect noted at bladder neck. (underwent TURP in 2019). \par  \par  Prostate biopsy 8/25/22 showed G8(4+4) disease in right posterior medial mid to apex (2/7, 10%) and also 1 core 7(4+3) (10%); 1/5 sites involved.\par  \par  Bone scan 9/9/22 negative for metastasis.  [LDR] : No LDR [HDR] : No HDR [RadiationCompletedDate] : 3/14/23 [EBRTDose] : 7048 [EBRTFractions] : 26 [ADTStartedDate] : 10/2022 [ADTDuration] : 2y [TTNM] : 3a [NTNM] : 0 [MTNM] : 0

## 2024-09-19 ENCOUNTER — TRANSCRIPTION ENCOUNTER (OUTPATIENT)
Age: 69
End: 2024-09-19

## 2024-09-27 ENCOUNTER — RESULT REVIEW (OUTPATIENT)
Age: 69
End: 2024-09-27

## 2024-10-22 ENCOUNTER — APPOINTMENT (OUTPATIENT)
Dept: CARDIOLOGY | Facility: CLINIC | Age: 69
End: 2024-10-22
Payer: COMMERCIAL

## 2024-10-22 VITALS
HEIGHT: 74 IN | WEIGHT: 220 LBS | OXYGEN SATURATION: 97 % | DIASTOLIC BLOOD PRESSURE: 74 MMHG | SYSTOLIC BLOOD PRESSURE: 149 MMHG | BODY MASS INDEX: 28.23 KG/M2 | HEART RATE: 73 BPM

## 2024-10-22 DIAGNOSIS — R01.1 CARDIAC MURMUR, UNSPECIFIED: ICD-10-CM

## 2024-10-22 DIAGNOSIS — E11.9 TYPE 2 DIABETES MELLITUS W/OUT COMPLICATIONS: ICD-10-CM

## 2024-10-22 DIAGNOSIS — I65.29 OCCLUSION AND STENOSIS OF UNSPECIFIED CAROTID ARTERY: ICD-10-CM

## 2024-10-22 DIAGNOSIS — E78.5 HYPERLIPIDEMIA, UNSPECIFIED: ICD-10-CM

## 2024-10-22 DIAGNOSIS — E79.0 HYPERURICEMIA W/OUT SIGNS OF INFLAMMATORY ARTHRITIS AND TOPHACEOUS DISEASE: ICD-10-CM

## 2024-10-22 DIAGNOSIS — M25.561 PAIN IN RIGHT KNEE: ICD-10-CM

## 2024-10-22 PROCEDURE — G2211 COMPLEX E/M VISIT ADD ON: CPT

## 2024-10-22 PROCEDURE — 93000 ELECTROCARDIOGRAM COMPLETE: CPT

## 2024-10-22 PROCEDURE — 99205 OFFICE O/P NEW HI 60 MIN: CPT

## 2024-10-22 RX ORDER — LOSARTAN POTASSIUM 25 MG/1
25 TABLET, FILM COATED ORAL DAILY
Refills: 3 | Status: ACTIVE | COMMUNITY

## 2024-11-20 ENCOUNTER — APPOINTMENT (OUTPATIENT)
Dept: RADIOLOGY | Facility: IMAGING CENTER | Age: 69
End: 2024-11-20

## 2024-11-20 ENCOUNTER — RESULT REVIEW (OUTPATIENT)
Age: 69
End: 2024-11-20

## 2024-11-20 ENCOUNTER — OUTPATIENT (OUTPATIENT)
Dept: OUTPATIENT SERVICES | Facility: HOSPITAL | Age: 69
LOS: 1 days | End: 2024-11-20
Payer: COMMERCIAL

## 2024-11-20 DIAGNOSIS — Z96.653 PRESENCE OF ARTIFICIAL KNEE JOINT, BILATERAL: Chronic | ICD-10-CM

## 2024-11-20 DIAGNOSIS — Z00.8 ENCOUNTER FOR OTHER GENERAL EXAMINATION: ICD-10-CM

## 2024-11-20 DIAGNOSIS — Z98.89 OTHER SPECIFIED POSTPROCEDURAL STATES: Chronic | ICD-10-CM

## 2024-11-20 PROCEDURE — 77080 DXA BONE DENSITY AXIAL: CPT | Mod: 26

## 2024-11-20 PROCEDURE — 77080 DXA BONE DENSITY AXIAL: CPT

## 2024-11-26 ENCOUNTER — MED ADMIN CHARGE (OUTPATIENT)
Age: 69
End: 2024-11-26

## 2024-11-26 ENCOUNTER — APPOINTMENT (OUTPATIENT)
Dept: CARDIOLOGY | Facility: CLINIC | Age: 69
End: 2024-11-26
Payer: COMMERCIAL

## 2024-11-26 PROCEDURE — 93015 CV STRESS TEST SUPVJ I&R: CPT

## 2024-11-26 PROCEDURE — 93880 EXTRACRANIAL BILAT STUDY: CPT

## 2024-11-26 PROCEDURE — 93306 TTE W/DOPPLER COMPLETE: CPT

## 2024-11-26 RX ADMIN — PERFLUTREN MG/ML: 6.52 INJECTION, SUSPENSION INTRAVENOUS at 00:00

## 2024-11-27 RX ORDER — PERFLUTREN 6.52 MG/ML
6.52 INJECTION, SUSPENSION INTRAVENOUS
Qty: 2 | Refills: 0 | Status: COMPLETED | OUTPATIENT
Start: 2024-11-26

## 2024-12-27 ENCOUNTER — OUTPATIENT (OUTPATIENT)
Dept: OUTPATIENT SERVICES | Facility: HOSPITAL | Age: 69
LOS: 1 days | End: 2024-12-27
Payer: COMMERCIAL

## 2024-12-27 ENCOUNTER — APPOINTMENT (OUTPATIENT)
Dept: ULTRASOUND IMAGING | Facility: IMAGING CENTER | Age: 69
End: 2024-12-27

## 2024-12-27 DIAGNOSIS — Z98.89 OTHER SPECIFIED POSTPROCEDURAL STATES: Chronic | ICD-10-CM

## 2024-12-27 DIAGNOSIS — Z96.653 PRESENCE OF ARTIFICIAL KNEE JOINT, BILATERAL: Chronic | ICD-10-CM

## 2024-12-27 DIAGNOSIS — Z00.8 ENCOUNTER FOR OTHER GENERAL EXAMINATION: ICD-10-CM

## 2024-12-27 PROCEDURE — 76536 US EXAM OF HEAD AND NECK: CPT

## 2024-12-27 PROCEDURE — 76536 US EXAM OF HEAD AND NECK: CPT | Mod: 26

## 2025-02-25 ENCOUNTER — APPOINTMENT (OUTPATIENT)
Dept: RADIATION ONCOLOGY | Facility: CLINIC | Age: 70
End: 2025-02-25

## 2025-02-25 VITALS
BODY MASS INDEX: 26.95 KG/M2 | HEIGHT: 74 IN | OXYGEN SATURATION: 98 % | SYSTOLIC BLOOD PRESSURE: 128 MMHG | RESPIRATION RATE: 17 BRPM | HEART RATE: 82 BPM | WEIGHT: 210 LBS | TEMPERATURE: 95.18 F | DIASTOLIC BLOOD PRESSURE: 70 MMHG

## 2025-02-25 PROCEDURE — 99213 OFFICE O/P EST LOW 20 MIN: CPT

## 2025-02-25 RX ORDER — PANTOPRAZOLE 40 MG/1
40 TABLET, DELAYED RELEASE ORAL DAILY
Refills: 0 | Status: ACTIVE | COMMUNITY
Start: 2025-02-25

## 2025-04-30 ENCOUNTER — DOCTOR'S OFFICE (OUTPATIENT)
Facility: LOCATION | Age: 70
Setting detail: OPHTHALMOLOGY
End: 2025-04-30
Payer: COMMERCIAL

## 2025-04-30 DIAGNOSIS — H01.004: ICD-10-CM

## 2025-04-30 DIAGNOSIS — H11.32: ICD-10-CM

## 2025-04-30 DIAGNOSIS — H35.3131: ICD-10-CM

## 2025-04-30 DIAGNOSIS — H00.025: ICD-10-CM

## 2025-04-30 DIAGNOSIS — H17.822: ICD-10-CM

## 2025-04-30 DIAGNOSIS — H11.153: ICD-10-CM

## 2025-04-30 DIAGNOSIS — E11.9: ICD-10-CM

## 2025-04-30 DIAGNOSIS — H35.033: ICD-10-CM

## 2025-04-30 DIAGNOSIS — H25.13: ICD-10-CM

## 2025-04-30 DIAGNOSIS — H00.022: ICD-10-CM

## 2025-04-30 DIAGNOSIS — Z79.84: ICD-10-CM

## 2025-04-30 DIAGNOSIS — H01.001: ICD-10-CM

## 2025-04-30 PROCEDURE — 92014 COMPRE OPH EXAM EST PT 1/>: CPT | Performed by: OPHTHALMOLOGY

## 2025-04-30 ASSESSMENT — REFRACTION_CURRENTRX
OD_SPHERE: +2.00
OS_VPRISM_DIRECTION: SV
OD_OVR_VA: 20/
OD_VPRISM_DIRECTION: SV
OS_SPHERE: +2.00
OD_SPHERE: +2.00
OD_CYLINDER: SPHERE
OD_OVR_VA: 20/
OS_CYLINDER: SPHERE
OS_OVR_VA: 20/
OS_OVR_VA: 20/
OS_VPRISM_DIRECTION: SV
OD_VPRISM_DIRECTION: SV
OS_SPHERE: +2.00

## 2025-04-30 ASSESSMENT — CONFRONTATIONAL VISUAL FIELD TEST (CVF)
OD_FINDINGS: FULL
OS_FINDINGS: FULL

## 2025-04-30 ASSESSMENT — TONOMETRY
OD_IOP_MMHG: 15
OS_IOP_MMHG: 14

## 2025-04-30 ASSESSMENT — VISUAL ACUITY
OS_BCVA: 20/20
OD_BCVA: 20/25

## 2025-08-21 ENCOUNTER — NON-APPOINTMENT (OUTPATIENT)
Age: 70
End: 2025-08-21

## 2025-08-22 ENCOUNTER — LABORATORY RESULT (OUTPATIENT)
Age: 70
End: 2025-08-22

## 2025-08-22 ENCOUNTER — APPOINTMENT (OUTPATIENT)
Dept: NEPHROLOGY | Facility: CLINIC | Age: 70
End: 2025-08-22
Payer: COMMERCIAL

## 2025-08-22 VITALS
WEIGHT: 203 LBS | DIASTOLIC BLOOD PRESSURE: 68 MMHG | RESPIRATION RATE: 16 BRPM | OXYGEN SATURATION: 98 % | HEIGHT: 74 IN | BODY MASS INDEX: 26.05 KG/M2 | SYSTOLIC BLOOD PRESSURE: 126 MMHG | HEART RATE: 80 BPM

## 2025-08-22 DIAGNOSIS — R80.9 PROTEINURIA, UNSPECIFIED: ICD-10-CM

## 2025-08-22 DIAGNOSIS — E11.9 TYPE 2 DIABETES MELLITUS W/OUT COMPLICATIONS: ICD-10-CM

## 2025-08-22 PROCEDURE — 99213 OFFICE O/P EST LOW 20 MIN: CPT

## 2025-08-24 PROBLEM — Z92.3 HISTORY OF RADIATION THERAPY: Status: ACTIVE | Noted: 2025-08-24

## 2025-08-26 ENCOUNTER — APPOINTMENT (OUTPATIENT)
Dept: RADIATION ONCOLOGY | Facility: CLINIC | Age: 70
End: 2025-08-26
Payer: COMMERCIAL

## 2025-08-26 VITALS
SYSTOLIC BLOOD PRESSURE: 117 MMHG | TEMPERATURE: 96.08 F | WEIGHT: 205.36 LBS | DIASTOLIC BLOOD PRESSURE: 71 MMHG | HEART RATE: 75 BPM | RESPIRATION RATE: 16 BRPM | OXYGEN SATURATION: 100 % | BODY MASS INDEX: 26.37 KG/M2

## 2025-08-26 DIAGNOSIS — Z92.3 PERSONAL HISTORY OF IRRADIATION: ICD-10-CM

## 2025-08-26 DIAGNOSIS — C61 MALIGNANT NEOPLASM OF PROSTATE: ICD-10-CM

## 2025-08-26 LAB
ALBUMIN MFR SERPL ELPH: 56.4 %
ALBUMIN SERPL-MCNC: 4 G/DL
ALBUMIN/GLOB SERPL: 1.3 RATIO
ALBUPE: 42.3 %
ALPHA1 GLOB MFR SERPL ELPH: 4.9 %
ALPHA1 GLOB SERPL ELPH-MCNC: 0.3 G/DL
ALPHA1UPE: 19.7 %
ALPHA2 GLOB MFR SERPL ELPH: 11.8 %
ALPHA2 GLOB SERPL ELPH-MCNC: 0.8 G/DL
ALPHA2UPE: 13 %
APPEARANCE: CLEAR
B-GLOBULIN MFR SERPL ELPH: 11.2 %
B-GLOBULIN SERPL ELPH-MCNC: 0.8 G/DL
BETAUPE: 13 %
BILIRUBIN URINE: NEGATIVE
BLOOD URINE: NEGATIVE
COLOR: YELLOW
CREAT SPEC-SCNC: 111 MG/DL
CREAT/PROT UR: 0.2 RATIO
GAMMA GLOB FLD ELPH-MCNC: 1.1 G/DL
GAMMA GLOB MFR SERPL ELPH: 15.7 %
GAMMAUPE: 12 %
GLUCOSE QUALITATIVE U: NEGATIVE MG/DL
IGA 24H UR QL IFE: NORMAL
INTERPRETATION SERPL IEP-IMP: NORMAL
KAPPA LC 24H UR QL: NORMAL
KETONES URINE: NEGATIVE MG/DL
LEUKOCYTE ESTERASE URINE: NEGATIVE
NITRITE URINE: NEGATIVE
PH URINE: 5.5
PROT PATTERN 24H UR ELPH-IMP: NORMAL
PROT SERPL-MCNC: 7.1 G/DL
PROT SERPL-MCNC: 7.1 G/DL
PROT UR-MCNC: 23 MG/DL
PROT UR-MCNC: 24 MG/DL
PROT UR-MCNC: 24 MG/DL
PROTEIN URINE: 30 MG/DL
SPECIFIC GRAVITY URINE: 1.02
UROBILINOGEN URINE: 0.2 MG/DL

## 2025-08-26 PROCEDURE — 99213 OFFICE O/P EST LOW 20 MIN: CPT
